# Patient Record
Sex: MALE | Race: AMERICAN INDIAN OR ALASKA NATIVE | Employment: UNEMPLOYED | URBAN - METROPOLITAN AREA
[De-identification: names, ages, dates, MRNs, and addresses within clinical notes are randomized per-mention and may not be internally consistent; named-entity substitution may affect disease eponyms.]

---

## 2019-07-20 ENCOUNTER — APPOINTMENT (OUTPATIENT)
Dept: ULTRASOUND IMAGING | Facility: CLINIC | Age: 44
End: 2019-07-20
Attending: INTERNAL MEDICINE
Payer: COMMERCIAL

## 2019-07-20 ENCOUNTER — HOSPITAL ENCOUNTER (INPATIENT)
Facility: CLINIC | Age: 44
LOS: 13 days | Discharge: HOME | End: 2019-08-02
Attending: EMERGENCY MEDICINE | Admitting: PEDIATRICS
Payer: COMMERCIAL

## 2019-07-20 DIAGNOSIS — B95.62 MRSA CELLULITIS: Primary | ICD-10-CM

## 2019-07-20 DIAGNOSIS — L03.115 CELLULITIS OF RIGHT LOWER EXTREMITY: ICD-10-CM

## 2019-07-20 DIAGNOSIS — L03.90 MRSA CELLULITIS: Primary | ICD-10-CM

## 2019-07-20 LAB
ALBUMIN SERPL-MCNC: 2.5 G/DL (ref 3.4–5)
ALP SERPL-CCNC: 87 U/L (ref 40–150)
ALT SERPL W P-5'-P-CCNC: 29 U/L (ref 0–70)
AMPHETAMINES UR QL SCN: POSITIVE
ANION GAP SERPL CALCULATED.3IONS-SCNC: 6 MMOL/L (ref 3–14)
AST SERPL W P-5'-P-CCNC: 22 U/L (ref 0–45)
BARBITURATES UR QL: NEGATIVE
BASOPHILS # BLD AUTO: 0 10E9/L (ref 0–0.2)
BASOPHILS NFR BLD AUTO: 0.1 %
BENZODIAZ UR QL: NEGATIVE
BILIRUB DIRECT SERPL-MCNC: 0.2 MG/DL (ref 0–0.2)
BILIRUB SERPL-MCNC: 0.4 MG/DL (ref 0.2–1.3)
BUN SERPL-MCNC: 14 MG/DL (ref 7–30)
CALCIUM SERPL-MCNC: 8.7 MG/DL (ref 8.5–10.1)
CANNABINOIDS UR QL SCN: NEGATIVE
CHLORIDE SERPL-SCNC: 103 MMOL/L (ref 94–109)
CO2 SERPL-SCNC: 27 MMOL/L (ref 20–32)
COCAINE UR QL: NEGATIVE
CREAT SERPL-MCNC: 0.6 MG/DL (ref 0.66–1.25)
CREAT SERPL-MCNC: 0.7 MG/DL (ref 0.66–1.25)
CRP SERPL-MCNC: 144 MG/L (ref 0–8)
DIFFERENTIAL METHOD BLD: ABNORMAL
EOSINOPHIL # BLD AUTO: 0.1 10E9/L (ref 0–0.7)
EOSINOPHIL NFR BLD AUTO: 1 %
ERYTHROCYTE [DISTWIDTH] IN BLOOD BY AUTOMATED COUNT: 13.4 % (ref 10–15)
ETHANOL UR QL SCN: NEGATIVE
GFR SERPL CREATININE-BSD FRML MDRD: >90 ML/MIN/{1.73_M2}
GFR SERPL CREATININE-BSD FRML MDRD: >90 ML/MIN/{1.73_M2}
GLUCOSE SERPL-MCNC: 111 MG/DL (ref 70–99)
HCT VFR BLD AUTO: 34.7 % (ref 40–53)
HGB BLD-MCNC: 11.5 G/DL (ref 13.3–17.7)
IMM GRANULOCYTES # BLD: 0.1 10E9/L (ref 0–0.4)
IMM GRANULOCYTES NFR BLD: 0.5 %
LACTATE BLD-SCNC: 1.1 MMOL/L (ref 0.7–2)
LYMPHOCYTES # BLD AUTO: 1.6 10E9/L (ref 0.8–5.3)
LYMPHOCYTES NFR BLD AUTO: 15.1 %
MCH RBC QN AUTO: 27.7 PG (ref 26.5–33)
MCHC RBC AUTO-ENTMCNC: 33.1 G/DL (ref 31.5–36.5)
MCV RBC AUTO: 84 FL (ref 78–100)
MONOCYTES # BLD AUTO: 1 10E9/L (ref 0–1.3)
MONOCYTES NFR BLD AUTO: 9.5 %
NEUTROPHILS # BLD AUTO: 7.9 10E9/L (ref 1.6–8.3)
NEUTROPHILS NFR BLD AUTO: 73.8 %
NRBC # BLD AUTO: 0 10*3/UL
NRBC BLD AUTO-RTO: 0 /100
OPIATES UR QL SCN: POSITIVE
PCP UR QL SCN: NEGATIVE
PLATELET # BLD AUTO: 376 10E9/L (ref 150–450)
PLATELET # BLD AUTO: 474 10E9/L (ref 150–450)
POTASSIUM SERPL-SCNC: 3.6 MMOL/L (ref 3.4–5.3)
PROT SERPL-MCNC: 7.2 G/DL (ref 6.8–8.8)
RBC # BLD AUTO: 4.15 10E12/L (ref 4.4–5.9)
SODIUM SERPL-SCNC: 136 MMOL/L (ref 133–144)
WBC # BLD AUTO: 10.8 10E9/L (ref 4–11)

## 2019-07-20 PROCEDURE — 25000128 H RX IP 250 OP 636: Performed by: EMERGENCY MEDICINE

## 2019-07-20 PROCEDURE — 36415 COLL VENOUS BLD VENIPUNCTURE: CPT | Performed by: PEDIATRICS

## 2019-07-20 PROCEDURE — 99207 ZZC APP CREDIT; MD BILLING SHARED VISIT: CPT | Performed by: INTERNAL MEDICINE

## 2019-07-20 PROCEDURE — 86803 HEPATITIS C AB TEST: CPT | Performed by: PEDIATRICS

## 2019-07-20 PROCEDURE — 86140 C-REACTIVE PROTEIN: CPT | Performed by: EMERGENCY MEDICINE

## 2019-07-20 PROCEDURE — 85049 AUTOMATED PLATELET COUNT: CPT | Performed by: PEDIATRICS

## 2019-07-20 PROCEDURE — 87340 HEPATITIS B SURFACE AG IA: CPT | Performed by: PEDIATRICS

## 2019-07-20 PROCEDURE — 25800030 ZZH RX IP 258 OP 636: Performed by: EMERGENCY MEDICINE

## 2019-07-20 PROCEDURE — 99223 1ST HOSP IP/OBS HIGH 75: CPT | Mod: AI | Performed by: PEDIATRICS

## 2019-07-20 PROCEDURE — 80307 DRUG TEST PRSMV CHEM ANLYZR: CPT | Performed by: INTERNAL MEDICINE

## 2019-07-20 PROCEDURE — 25000128 H RX IP 250 OP 636: Performed by: PEDIATRICS

## 2019-07-20 PROCEDURE — 80076 HEPATIC FUNCTION PANEL: CPT | Performed by: PEDIATRICS

## 2019-07-20 PROCEDURE — 25000132 ZZH RX MED GY IP 250 OP 250 PS 637: Performed by: INTERNAL MEDICINE

## 2019-07-20 PROCEDURE — 99285 EMERGENCY DEPT VISIT HI MDM: CPT | Mod: Z6 | Performed by: EMERGENCY MEDICINE

## 2019-07-20 PROCEDURE — 85025 COMPLETE CBC W/AUTO DIFF WBC: CPT | Performed by: EMERGENCY MEDICINE

## 2019-07-20 PROCEDURE — 99221 1ST HOSP IP/OBS SF/LOW 40: CPT | Performed by: PSYCHIATRY & NEUROLOGY

## 2019-07-20 PROCEDURE — 86706 HEP B SURFACE ANTIBODY: CPT | Performed by: PEDIATRICS

## 2019-07-20 PROCEDURE — 93971 EXTREMITY STUDY: CPT | Mod: RT

## 2019-07-20 PROCEDURE — 82565 ASSAY OF CREATININE: CPT | Performed by: PEDIATRICS

## 2019-07-20 PROCEDURE — 12000001 ZZH R&B MED SURG/OB UMMC

## 2019-07-20 PROCEDURE — 25800030 ZZH RX IP 258 OP 636: Performed by: PEDIATRICS

## 2019-07-20 PROCEDURE — 99285 EMERGENCY DEPT VISIT HI MDM: CPT | Mod: 25 | Performed by: EMERGENCY MEDICINE

## 2019-07-20 PROCEDURE — 83605 ASSAY OF LACTIC ACID: CPT | Performed by: EMERGENCY MEDICINE

## 2019-07-20 PROCEDURE — 87389 HIV-1 AG W/HIV-1&-2 AB AG IA: CPT | Performed by: PEDIATRICS

## 2019-07-20 PROCEDURE — 96365 THER/PROPH/DIAG IV INF INIT: CPT | Performed by: EMERGENCY MEDICINE

## 2019-07-20 PROCEDURE — 80320 DRUG SCREEN QUANTALCOHOLS: CPT | Performed by: INTERNAL MEDICINE

## 2019-07-20 PROCEDURE — 87040 BLOOD CULTURE FOR BACTERIA: CPT | Performed by: EMERGENCY MEDICINE

## 2019-07-20 PROCEDURE — 80048 BASIC METABOLIC PNL TOTAL CA: CPT | Performed by: EMERGENCY MEDICINE

## 2019-07-20 RX ORDER — POLYETHYLENE GLYCOL 3350 17 G/17G
17 POWDER, FOR SOLUTION ORAL DAILY PRN
Status: DISCONTINUED | OUTPATIENT
Start: 2019-07-20 | End: 2019-08-02

## 2019-07-20 RX ORDER — BUPRENORPHINE 2 MG/1
4 TABLET SUBLINGUAL 2 TIMES DAILY PRN
Status: DISCONTINUED | OUTPATIENT
Start: 2019-07-20 | End: 2019-07-22

## 2019-07-20 RX ORDER — NALOXONE HYDROCHLORIDE 0.4 MG/ML
.1-.4 INJECTION, SOLUTION INTRAMUSCULAR; INTRAVENOUS; SUBCUTANEOUS
Status: DISCONTINUED | OUTPATIENT
Start: 2019-07-20 | End: 2019-08-02 | Stop reason: HOSPADM

## 2019-07-20 RX ORDER — CLONIDINE HYDROCHLORIDE 0.1 MG/1
0.1 TABLET ORAL 3 TIMES DAILY PRN
Status: DISCONTINUED | OUTPATIENT
Start: 2019-07-20 | End: 2019-07-20

## 2019-07-20 RX ORDER — VANCOMYCIN HYDROCHLORIDE 1 G/200ML
15 INJECTION, SOLUTION INTRAVENOUS ONCE
Status: COMPLETED | OUTPATIENT
Start: 2019-07-20 | End: 2019-07-20

## 2019-07-20 RX ORDER — OXYCODONE HYDROCHLORIDE 5 MG/1
5-10 TABLET ORAL
Status: DISCONTINUED | OUTPATIENT
Start: 2019-07-20 | End: 2019-07-20

## 2019-07-20 RX ORDER — SODIUM CHLORIDE 9 MG/ML
1000 INJECTION, SOLUTION INTRAVENOUS CONTINUOUS
Status: DISCONTINUED | OUTPATIENT
Start: 2019-07-20 | End: 2019-07-20

## 2019-07-20 RX ORDER — CLOTRIMAZOLE 1 G/ML
SOLUTION TOPICAL 2 TIMES DAILY
Status: DISCONTINUED | OUTPATIENT
Start: 2019-07-20 | End: 2019-08-02 | Stop reason: HOSPADM

## 2019-07-20 RX ORDER — SODIUM CHLORIDE 9 MG/ML
1000 INJECTION, SOLUTION INTRAVENOUS CONTINUOUS
Status: DISCONTINUED | OUTPATIENT
Start: 2019-07-20 | End: 2019-07-22

## 2019-07-20 RX ORDER — CEFAZOLIN SODIUM 1 G/50ML
1250 SOLUTION INTRAVENOUS EVERY 12 HOURS
Status: DISCONTINUED | OUTPATIENT
Start: 2019-07-20 | End: 2019-07-22

## 2019-07-20 RX ORDER — OXYCODONE HYDROCHLORIDE 5 MG/1
5 TABLET ORAL EVERY 4 HOURS PRN
Status: DISCONTINUED | OUTPATIENT
Start: 2019-07-20 | End: 2019-08-02

## 2019-07-20 RX ADMIN — SODIUM CHLORIDE 1000 ML: 9 INJECTION, SOLUTION INTRAVENOUS at 06:42

## 2019-07-20 RX ADMIN — VANCOMYCIN HYDROCHLORIDE 1250 MG: 10 INJECTION, POWDER, LYOPHILIZED, FOR SOLUTION INTRAVENOUS at 18:05

## 2019-07-20 RX ADMIN — SODIUM CHLORIDE 1000 ML: 9 INJECTION, SOLUTION INTRAVENOUS at 04:53

## 2019-07-20 RX ADMIN — CLOTRIMAZOLE: 10 SOLUTION TOPICAL at 19:54

## 2019-07-20 RX ADMIN — VANCOMYCIN HYDROCHLORIDE 1000 MG: 1 INJECTION, SOLUTION INTRAVENOUS at 05:13

## 2019-07-20 RX ADMIN — CLOTRIMAZOLE: 10 SOLUTION TOPICAL at 13:21

## 2019-07-20 RX ADMIN — ENOXAPARIN SODIUM 40 MG: 40 INJECTION SUBCUTANEOUS at 10:27

## 2019-07-20 ASSESSMENT — MIFFLIN-ST. JEOR: SCORE: 1668.31

## 2019-07-20 ASSESSMENT — ACTIVITIES OF DAILY LIVING (ADL)
ADLS_ACUITY_SCORE: 10
SWALLOWING: 0-->SWALLOWS FOODS/LIQUIDS WITHOUT DIFFICULTY
WHICH_OF_THE_ABOVE_FUNCTIONAL_RISKS_HAD_A_RECENT_ONSET_OR_CHANGE?: AMBULATION
ADLS_ACUITY_SCORE: 10
TOILETING: 0-->INDEPENDENT
ADLS_ACUITY_SCORE: 10
RETIRED_EATING: 0-->INDEPENDENT
AMBULATION: 0-->INDEPENDENT
BATHING: 0-->INDEPENDENT
TRANSFERRING: 0-->INDEPENDENT
DRESS: 0-->INDEPENDENT
RETIRED_COMMUNICATION: 0-->UNDERSTANDS/COMMUNICATES WITHOUT DIFFICULTY
FALL_HISTORY_WITHIN_LAST_SIX_MONTHS: NO
COGNITION: 0 - NO COGNITION ISSUES REPORTED

## 2019-07-20 NOTE — PLAN OF CARE
Pt up to unit at 0607 from ED. AxOx4, sleepy. Denies CP, N/T, nausea, SOB. IV infusing NS and vanco. VSS. BP elevated. Pt ambulated into room independently. Oriented pt to room. Left in care of day shift RN.

## 2019-07-20 NOTE — PROGRESS NOTES
"Focus: Security alert  DB: Went into patients room to get him ready to start completing a shower and complete foot care. While helping patient get hygiene cares completed and changing his clothing nursing noticed that syringes, medication, containers fell out of his shorts that he was wearing.   I: Called Dr Arguelles and nursing supervisor to obtain directions on how to move forward.   E: Patient states\" You can lock all that stuff up for me if you want.\" Patient was open and free with emptying the rest of his pockets. Patient continues to be extremely sedated. He can open his eyes and answer questions however he falls back asleep mid sentence. In belongings he had multiple syringes, alcohol swabs, Suboxone(States patient) He also states\" I didn't take anything today. Credit cards, wallet,containers of unknown etc.... Dr Arguelles is going to come down and speak with patient. Utox of urine is going to be obtained per Dr Arguelles's orders. Status of patient will continue to be monitored.   "

## 2019-07-20 NOTE — PROGRESS NOTES
"Focus: Vero Arguelles's conversation with patient  DB: Dr Arguelles spoke with patient and asked if it would be ok if we searched his room?   E: Patient stated\" that is ok they can.\" Security was alerted of plan of cares for room search. Other items that were left at desk with charge nurse Hansel. Also plan is for patient to be placed on 1;1. Status of patient will continue to be monitored.   "

## 2019-07-20 NOTE — CONSULTS
Brief note. Full consult to follow.     Recommendations:  1) Could start Subutex 4mg BID PRN COWS of 9 or greater to ensure patient entering opiate withdrawal and then transition to Suboxone at discharge.   2) Patient reports needing a Suboxone provider but indicates he does have enough current medication to bridge to an appointment. CTCs on [facility] (1-7129) can be a resource for providers.   3) The patient is open to CD treatment at this time. Agree with SW consult.   4) Patient unwilling to start a psychotropic at this time. Please reconsult psychiatry should he change his mind.     Thank you for this interesting consult.     Colin Martinez MD

## 2019-07-20 NOTE — PHARMACY-VANCOMYCIN DOSING SERVICE
Pharmacy Vancomycin Initial Note  Date of Service 2019  Patient's  1975  43 year old, male    Indication: Skin and Soft Tissue Infection    Current estimated CrCl = CrCl cannot be calculated (No order found.).    Creatinine for last 3 days  No results found for requested labs within last 72 hours.    Recent Vancomycin Level(s) for last 3 days  No results found for requested labs within last 72 hours.      Vancomycin IV Administrations (past 72 hours)      No vancomycin orders with administrations in past 72 hours.                Nephrotoxins and other renal medications (From now, onward)    Start     Dose/Rate Route Frequency Ordered Stop    19 0500  vancomycin (VANCOCIN) 1000 mg in dextrose 5% 200 mL PREMIX      15 mg/kg × 74.1 kg  200 mL/hr over 1 Hours Intravenous ONCE 19 0434            Contrast Orders - past 72 hours (72h ago, onward)    None                Plan:  1.  Start vancomycin  1000 mg ( 15 mg/kg ) IV once.   2.  Goal Trough Level: 10-15 mg/L   3.  Pharmacy will check trough levels as appropriate.  4. Serum creatinine levels will be ordered a minimum of twice weekly.    5. [place] method utilized to dose vancomycin therapy: Method 2    Patrick Solorzano
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
## 2019-07-20 NOTE — CONSULTS
"[facility], [place]   Psychiatric Consult   Consult date: 7/20/2019        Reason for Consult:   Depression, opiate use disorder        HPI:     The patient is a 44yo male with a history of depression and opiate use disorder who was seen for opiate withdrawal. Has been sedated and did have syringes and Suboxone in his pants pocket on admission. The patient reports that he is doing \"all right.\" Does report some withdrawal symptoms of running nose and body aches. Denies problems with sleep or appetite. Denies SI or HI. Is open to CD treatment and would like to resume Suboxone maintenance. No current provider. Does not feel that he needs medications for depression or anxiety.          Past Psychiatric History:     PTSD, MDD versus BAD  Has been on Suboxone maintenance. No current provider.   Has been on gabapentin, zoloft, Tenex and VPA in the past.         Substance Use and History:     Opiate use disorder  History of alcohol use disorder        Past Medical History:   PAST MEDICAL HISTORY: History reviewed. No pertinent past medical history.    PAST SURGICAL HISTORY: History reviewed. No pertinent surgical history.          Family History:   FAMILY HISTORY: History reviewed. No pertinent family history.        Social History:   SOCIAL HISTORY:   Social History     Tobacco Use     Smoking status: Current Every Day Smoker     Packs/day: 0.50     Smokeless tobacco: Never Used   Substance Use Topics     Alcohol use: Not Currently              PTA Medications:     No medications prior to admission.          Allergies:     Allergies   Allergen Reactions     Shellfish-Derived Products Shortness Of Breath          Labs:     Recent Results (from the past 48 hour(s))   CBC with platelets differential    Collection Time: 07/20/19  4:37 AM   Result Value Ref Range    WBC 10.8 4.0 - 11.0 10e9/L    RBC Count 4.15 (L) 4.4 - 5.9 10e12/L    Hemoglobin 11.5 (L) 13.3 - 17.7 g/dL    Hematocrit 34.7 (L) 40.0 " - 53.0 %    MCV 84 78 - 100 fl    MCH 27.7 26.5 - 33.0 pg    MCHC 33.1 31.5 - 36.5 g/dL    RDW 13.4 10.0 - 15.0 %    Platelet Count 474 (H) 150 - 450 10e9/L    Diff Method Automated Method     % Neutrophils 73.8 %    % Lymphocytes 15.1 %    % Monocytes 9.5 %    % Eosinophils 1.0 %    % Basophils 0.1 %    % Immature Granulocytes 0.5 %    Nucleated RBCs 0 0 /100    Absolute Neutrophil 7.9 1.6 - 8.3 10e9/L    Absolute Lymphocytes 1.6 0.8 - 5.3 10e9/L    Absolute Monocytes 1.0 0.0 - 1.3 10e9/L    Absolute Eosinophils 0.1 0.0 - 0.7 10e9/L    Absolute Basophils 0.0 0.0 - 0.2 10e9/L    Abs Immature Granulocytes 0.1 0 - 0.4 10e9/L    Absolute Nucleated RBC 0.0    Basic metabolic panel    Collection Time: 07/20/19  4:37 AM   Result Value Ref Range    Sodium 136 133 - 144 mmol/L    Potassium 3.6 3.4 - 5.3 mmol/L    Chloride 103 94 - 109 mmol/L    Carbon Dioxide 27 20 - 32 mmol/L    Anion Gap 6 3 - 14 mmol/L    Glucose 111 (H) 70 - 99 mg/dL    Urea Nitrogen 14 7 - 30 mg/dL    Creatinine 0.70 0.66 - 1.25 mg/dL    GFR Estimate >90 >60 mL/min/[1.73_m2]    GFR Estimate If Black >90 >60 mL/min/[1.73_m2]    Calcium 8.7 8.5 - 10.1 mg/dL   CRP inflammation    Collection Time: 07/20/19  4:37 AM   Result Value Ref Range    CRP Inflammation 144.0 (H) 0.0 - 8.0 mg/L   Lactic acid whole blood    Collection Time: 07/20/19  4:37 AM   Result Value Ref Range    Lactic Acid 1.1 0.7 - 2.0 mmol/L   Blood culture    Collection Time: 07/20/19  4:38 AM   Result Value Ref Range    Specimen Description Blood Right Hand     Special Requests Aerobic and anaerobic bottles received     Culture Micro No growth after 7 hours    Blood culture    Collection Time: 07/20/19  5:04 AM   Result Value Ref Range    Specimen Description Blood Left Arm     Special Requests Aerobic and anaerobic bottles received     Culture Micro No growth after 7 hours    Platelet count    Collection Time: 07/20/19 10:59 AM   Result Value Ref Range    Platelet Count 376 150 - 450  "10e9/L   Creatinine    Collection Time: 07/20/19 10:59 AM   Result Value Ref Range    Creatinine 0.60 (L) 0.66 - 1.25 mg/dL    GFR Estimate >90 >60 mL/min/[1.73_m2]    GFR Estimate If Black >90 >60 mL/min/[1.73_m2]   Hepatic panel    Collection Time: 07/20/19 10:59 AM   Result Value Ref Range    Bilirubin Direct 0.2 0.0 - 0.2 mg/dL    Bilirubin Total 0.4 0.2 - 1.3 mg/dL    Albumin 2.5 (L) 3.4 - 5.0 g/dL    Protein Total 7.2 6.8 - 8.8 g/dL    Alkaline Phosphatase 87 40 - 150 U/L    ALT 29 0 - 70 U/L    AST 22 0 - 45 U/L          Physical and Psychiatric Examination:     BP (!) 158/96 (BP Location: Left arm)   Pulse 69   Temp 97.3  F (36.3  C) (Oral)   Resp 16   Ht 1.803 m (5' 11\")   Wt 74.1 kg (163 lb 6.4 oz)   SpO2 100%   BMI 22.79 kg/m    Weight is 163 lbs 6.4 oz  Body mass index is 22.79 kg/m .    Physical Exam:  I have reviewed the physical exam as documented by by the medical team and agree with findings and assessment and have no additional findings to add at this time.    Mental Status Exam:  Appearance: awake, alert and adequately groomed  Attitude:  cooperative  Eye Contact:  poor   Mood:  \"all right\"  Affect:  mood congruent  Speech:  paucity of speech  Language: fluent and intact in English  Psychomotor, Gait, Musculoskeletal:  no evidence of tardive dyskinesia, dystonia, or tics  Thought Process:  goal oriented  Associations:  no loose associations  Thought Content:  no evidence of suicidal ideation or homicidal ideation and no evidence of psychotic thought  Insight:  fair  Judgement:  fair  Oriented to:  time, person, and place  Attention Span and Concentration:  intact  Recent and Remote Memory:  fair  Fund of Knowledge:  appropriate         Diagnoses:   Opiate use disorder  History of MDD verus BAD and PTSD         Assessment & Plan:     Recommendations:  1) Could start Subutex 4mg BID PRN COWS of 9 or greater to ensure patient entering opiate withdrawal and then transition to Suboxone at " discharge.   2) Patient reports needing a Suboxone provider but indicates he does have enough current medication to bridge to an appointment. CTCs on [facility] (8-7802) can be a resource for providers.   3) The patient is open to CD treatment at this time. Agree with SW consult.   4) Patient unwilling to start a psychotropic at this time. Please reconsult psychiatry should he change his mind.     Thank you for this interesting consult.     Colin Martinez MD

## 2019-07-20 NOTE — PROGRESS NOTES
"  VS: Stable   O2: Room air saturations 100%   Output: Still awaiting for first void. Patient has not been walked up to the bathroom yet   Last BM: 7/18/2019 reports patient   Activity: Patient says\" I walk but I just don't want to right now.\"    Skin: Right lower leg is reddened and swollen. Bottom of both feet are flaky and odorous. Pt's redness in right lower leg has been marked to monitor.   Pain: Pt denies pain currently   CMS: Intact   Dressing: NA   Diet: Regular Pt falls asleep in between bites of food.    LDA: NA   Equipment: Bed alarm on bed place on due to patients sedation. Placed patient on contact isolation for MRSA   Plan: Patient states\" I have been homeless for 1 month.\" When asked do you have family that can help you he said \"there around.\" Pt is a poor historian and seems to answer differently to questions. eg day of week one time he answered Wed another time Monday.    Additional Info: Asked patient with Dr Arguelles if he had suffered from a fall and he said \"No\" continue to monitor patients sedation and cognitive status. Status of patient will continue to be monitored.        "

## 2019-07-20 NOTE — PROGRESS NOTES
"[facility] Internal Medicine Progress Note            Interval History:   Record reviewed including admission note.  Seen on multiple occasions with RN.  Sedated most of the day with fluent response to questions.  RN with removal of clothes to shower note multiple syringes plus suboxone - which patient indcates he purchased on there street.  Denies use earlier today.  Indicates RLE worse since discharge from [facility].  Indicates no chills, sweats or fever.  No HA or dizziness.  No CP, SOB, cough, nausea, reflux, abd pain.  Last BM 3 days ago.  Voiding OK.  No arthralgias, rash, focal neuro c/o.  Indicates use of IV heroin 1 gm daily with last use AM 7/19.  Unclear when or if he used suboxone. Denies alcohol use.  ER visit [facility] 6/8/19 for RLE cellulitis.  Neg doppler US.  Treated with bactrim.  BC returned positive for MRSA.  Unable to contact patient.            Medications:   All medications reviewed today          Physical Exam:   Blood pressure (!) 158/96, pulse 69, temperature 97.3  F (36.3  C), temperature source Oral, resp. rate 16, height 1.803 m (5' 11\"), weight 74.1 kg (163 lb 6.4 oz), SpO2 100 %.    Intake/Output Summary (Last 24 hours) at 7/20/2019 1629  Last data filed at 7/20/2019 1149  Gross per 24 hour   Intake 450 ml   Output 400 ml   Net 50 ml       General:  Sedated.  Fluent speech.  Cooperative.  Orientated to person, place.  Not day or date.  No clinical distress.   No O2.     Heent:  Atraumatic.      Neck:    Skin:    Chest:  clear    Cardiac:  Reg without gallop, murmur.  No JVD.     Abdomen:  Non distended, soft, non-tender.  BS normal.     Extremities:  Perfused.  RLE - mild circumferential swelling.  Faint diffuse erythema anterior tibia from ankle to proximal 3rd RLE.  No calf, posterior thigh tenderness to suggest DVT.      Neuro:  No tremor.             Data:     Results for orders placed or performed during the hospital encounter of 07/20/19 (from the past 24 hour(s))   CBC with " platelets differential   Result Value Ref Range    WBC 10.8 4.0 - 11.0 10e9/L    RBC Count 4.15 (L) 4.4 - 5.9 10e12/L    Hemoglobin 11.5 (L) 13.3 - 17.7 g/dL    Hematocrit 34.7 (L) 40.0 - 53.0 %    MCV 84 78 - 100 fl    MCH 27.7 26.5 - 33.0 pg    MCHC 33.1 31.5 - 36.5 g/dL    RDW 13.4 10.0 - 15.0 %    Platelet Count 474 (H) 150 - 450 10e9/L    Diff Method Automated Method     % Neutrophils 73.8 %    % Lymphocytes 15.1 %    % Monocytes 9.5 %    % Eosinophils 1.0 %    % Basophils 0.1 %    % Immature Granulocytes 0.5 %    Nucleated RBCs 0 0 /100    Absolute Neutrophil 7.9 1.6 - 8.3 10e9/L    Absolute Lymphocytes 1.6 0.8 - 5.3 10e9/L    Absolute Monocytes 1.0 0.0 - 1.3 10e9/L    Absolute Eosinophils 0.1 0.0 - 0.7 10e9/L    Absolute Basophils 0.0 0.0 - 0.2 10e9/L    Abs Immature Granulocytes 0.1 0 - 0.4 10e9/L    Absolute Nucleated RBC 0.0    Basic metabolic panel   Result Value Ref Range    Sodium 136 133 - 144 mmol/L    Potassium 3.6 3.4 - 5.3 mmol/L    Chloride 103 94 - 109 mmol/L    Carbon Dioxide 27 20 - 32 mmol/L    Anion Gap 6 3 - 14 mmol/L    Glucose 111 (H) 70 - 99 mg/dL    Urea Nitrogen 14 7 - 30 mg/dL    Creatinine 0.70 0.66 - 1.25 mg/dL    GFR Estimate >90 >60 mL/min/[1.73_m2]    GFR Estimate If Black >90 >60 mL/min/[1.73_m2]    Calcium 8.7 8.5 - 10.1 mg/dL   CRP inflammation   Result Value Ref Range    CRP Inflammation 144.0 (H) 0.0 - 8.0 mg/L   Lactic acid whole blood   Result Value Ref Range    Lactic Acid 1.1 0.7 - 2.0 mmol/L   Blood culture   Result Value Ref Range    Specimen Description Blood Right Hand     Special Requests Aerobic and anaerobic bottles received     Culture Micro No growth after 7 hours    Blood culture   Result Value Ref Range    Specimen Description Blood Left Arm     Special Requests Aerobic and anaerobic bottles received     Culture Micro No growth after 7 hours    Platelet count   Result Value Ref Range    Platelet Count 376 150 - 450 10e9/L   Creatinine   Result Value Ref Range     Creatinine 0.60 (L) 0.66 - 1.25 mg/dL    GFR Estimate >90 >60 mL/min/[1.73_m2]    GFR Estimate If Black >90 >60 mL/min/[1.73_m2]                Assessment and Plan:   1)  RLE cellulitis,dating back to 6/8 incompletely or inadequately treated.  Recheck doppler to ensure no DVT.  2)  MRSA bacteremia documented 6/8.  Clinically not toxic or septic appearing.  On IV vanco.  No murmur or splinters to suggest SBE.  3)  IV heroin dependence, continuous.  No overt features of opiate WD clinically.  COWs low per RN. Unclear when last used suboxone.  Prior accidental right eye per record.  4)  HTN per record.  Off meds.  Previously on atenolol.   5)  Depression, anxiety per record.   6)  Altered MS with sedation, mild confusion suspect related to substance use.  Clinically does not appear septic.  No h/o closed head injury.   7)  Peripheral neuropathy per record previously on neurontin.     PLAN:  1)  Review meds, orders.  2)  Search room with drug paraphernalia.  1:1 sitter.  Bed alarm with concern regarding gait stability. Up with assist. 3)  Utox.  4)  F/u cultures.  Continue IV vanco.  Echo.  5)  Venous doppler RLE.  6)  Psyche consult.  Intervention regarding depression, anxiety, recs for detox.  7)  Clonidine for now prn.  Hold off on methadone with sedation.  Monitor COWs.  8)  Trend labs.  Add hepatic profile.  9)  Consider ID consult.  Monitor clinically.   Disposition Plan   Expected discharge unclear pending course and dispo.      Entered: Marcus Arguelles 07/20/2019, 4:29 PM     ADD:  Subutex ordered by psychiatry for opiate WD.  Discontinue clonidine.         Attestation:  I have reviewed today's vital signs, notes, medications, labs and imaging.     Marcus Arguelles MD

## 2019-07-20 NOTE — PROGRESS NOTES
"Focus: patients sedation  DB: In interviewing with patient this am he is quite sleepy. He is able to answer questions but is very disengaged with interview with nursing. Patient states\" I am homeless.\" Patient also states\" I shot up with a dirty needle last night at 7:30 last night. I normally shoot up 2 x a day.\"   I: Assessed patients lower legs.   E: Patient has a swollen right lower leg. In assessing patients lower legs he is in need of hygiene cares. Both feet are very Odorous and are in need of cares. Right lower leg is more reddened and edematous then the left lower leg. Took  Patients shoes and socks off on both lower feet. Will continue to keep patient on continuous oximetry and monitor patients status.   "

## 2019-07-20 NOTE — ED PROVIDER NOTES
"  History     Chief Complaint   Patient presents with     Leg Swelling     c/o right lower leg and ankle  pain, redness, and swelling. pt reported he was seen in [facility] a moth ago for same reason. He was on oral antibiotic for 10 days.       HPI  Mert Lyle is a 43 year old male history of opiate dependence, homelessness who is seen at [facility] 1 month ago for right ankle cellulitis treated with Bactrim and discharged.  He is now returning for worsening swelling and redness and pain over the right ankle bilaterally, has been more difficult to walk lately.    Care everywhere reveals he was indeed treated for cellulitis with Bactrim 1 DS twice daily x10 days.  Blood cultures were positive for MRSA, however patient contact could not be met.  Patient reports he improved somewhat for approximately 1 week and then for the last 2 weeks has been worsening.  No associated fevers or chills chest pain or shortness of breath.  Walking has been somewhat difficult due to pain.  Continues to use IV heroin, injected into the right leg as well and asked this point.    No numbness or tingling.  No drainage.  No open wounds.    I have reviewed the Medications, Allergies, Past Medical and Surgical History, and Social History in the Epic system.    Review of Systems   14 point review of symptoms was performed and is negative except as noted above.     Physical Exam   BP: 135/87  Pulse: 92  Temp: 98.4  F (36.9  C)  Resp: 16  Height: 180.3 cm (5' 11\")  Weight: 74.1 kg (163 lb 6.4 oz)  SpO2: 99 %      Physical Exam  GEN: Well appearing, non toxic, cooperative and conversant.   HEENT: The head is normocephalic and atraumatic. Pupils are equal round and reactive to light. Extraocular motions are intact. There is no facial swelling. The neck is nontender and supple.  Multiple tattoos on the face and neck  CV: Regular rate and rhythm. 2+ radial pulses bilaterally.  PULM: Clear to auscultation bilaterally.  ABD: Soft, nontender, " nondistended.   EXT: Right foot and distal third of the right leg is somewhat edematous, warm and erythematous.  No pustules no blisters though there is some dry flaking skin.  Certainly warm to touch compared to the contralateral side.  2+ DP pulse palpated bilaterally.  No significant pain along the ankle joint, talus midfoot or phalanges to suggest joint involvement.  NEURO: Cranial nerves II through XII are intact and symmetric. Bilateral upper and lower extremities grossly show full range of motion without any focal deficits.   SKIN: No rashes, ecchymosis, or lacerations  PSYCH: Calm and cooperative, interactive.     ED Course        Procedures               Labs Ordered and Resulted from Time of ED Arrival Up to the Time of Departure from the ED   CBC WITH PLATELETS DIFFERENTIAL - Abnormal; Notable for the following components:       Result Value    RBC Count 4.15 (*)     Hemoglobin 11.5 (*)     Hematocrit 34.7 (*)     Platelet Count 474 (*)     All other components within normal limits   BASIC METABOLIC PANEL - Abnormal; Notable for the following components:    Glucose 111 (*)     All other components within normal limits   CRP INFLAMMATION - Abnormal; Notable for the following components:    CRP Inflammation 144.0 (*)     All other components within normal limits   LACTIC ACID WHOLE BLOOD   HIV ANTIGEN ANTIBODY COMBO   HEPATITIS C ANTIBODY   HEPATITIS B SURFACE ANTIGEN   HEPATITIS B SURFACE ANTIBODY            Assessments & Plan (with Medical Decision Making)   43-year-old male with history of opiate abuse presenting with recurrent cellulitis failing outpatient Bactrim.  His dosing was likely less than appropriate for MRSA however given his risk factors ongoing duration of active cellulitis, bacteremia are present before his demonstrate a by positive blood cultures he is certainly appropriate for admission for cellulitis and IV vancomycin to start.    Blood culture result from [facility] reviewed showing that  outpatient options may be appropriate once patient has been given IV treatment given his clinical circumstance.  Patient agrees with admission.  Basic labs sent including blood cultures  Labs are revealing for CRP of 144 consistent with infection.  WBC 10.8.  Lactic acid 1.1 blood cultures are pending.    Discussed with admitting hospitalist, awaiting bed for further management.    I have reviewed the nursing notes.    I have reviewed the findings, diagnosis, plan and need for follow up with the patient.       Medication List      There are no discharge medications for this visit.         Final diagnoses:   Cellulitis of right lower extremity       7/20/2019   [facility], [place], EMERGENCY DEPARTMENT     Rusty Shahid MD  07/20/19 0800

## 2019-07-20 NOTE — PROGRESS NOTES
Focus: Security search of room  DB Security and nursing went into patients room to conduct room search with patients consent  I: Patient was placed on 1;1 per Dr Arguelles orders  E: status of patient will continue to be monitored Pt is so extremely sedated and now is laying in bed drooling with mouth wide open. Patient will answer questions but then immediately goes back to sleep.Pt is very diaphoretic looking skin is clammy to touch. .

## 2019-07-20 NOTE — ED NOTES
[facility], [place]   ED Nurse to Floor Handoff     Mert Lyle is a 43 year old male who speaks English and lives with family members,  in a home  They arrived in the ED by car from home    ED Chief Complaint: Leg Swelling (c/o right lower leg and ankle  pain, redness, and swelling. pt reported he was seen in [facility] a moth ago for same reason. He was on oral antibiotic for 10 days.  )    ED Dx;   Final diagnoses:   None         Needed?: No    Allergies:   Allergies   Allergen Reactions     Shellfish-Derived Products Shortness Of Breath   .  Past Medical Hx: History reviewed. No pertinent past medical history.   Baseline Mental status: WDL  Current Mental Status changes: at basesline    Infection present or suspected this encounter: cultures pending  Sepsis suspected: No  Isolation type: No active isolations     Activity level - Baseline/Home:  Independent  Activity Level - Current:   Independent    Bariatric equipment needed?: No    In the ED these meds were given:   Medications   0.9% sodium chloride BOLUS (1,000 mLs Intravenous New Bag 7/20/19 0453)     Followed by   sodium chloride 0.9% infusion (has no administration in time range)   vancomycin (VANCOCIN) 1000 mg in dextrose 5% 200 mL PREMIX (1,000 mg Intravenous New Bag 7/20/19 0513)       Drips running?  No    Home pump  No    Current LDAs  Peripheral IV 07/20/19 Left Lower forearm (Active)   Site Assessment WDL 7/20/2019  5:07 AM   Line Status Infusing 7/20/2019  5:07 AM   Phlebitis Scale 0-->no symptoms 7/20/2019  5:07 AM   Infiltration Scale 0 7/20/2019  5:07 AM   Infiltration Site Treatment Method  None 7/20/2019  5:07 AM   Extravasation? No 7/20/2019  5:07 AM   Dressing Intervention New dressing  7/20/2019  5:07 AM   Number of days: 0       Labs results:   Labs Ordered and Resulted from Time of ED Arrival Up to the Time of Departure from the ED   CBC WITH PLATELETS DIFFERENTIAL   BASIC METABOLIC PANEL   CRP  "INFLAMMATION   LACTIC ACID WHOLE BLOOD   BLOOD CULTURE   BLOOD CULTURE       Imaging Studies: No results found for this or any previous visit (from the past 24 hour(s)).    Recent vital signs:   /87   Pulse 92   Temp 98.4  F (36.9  C) (Oral)   Resp 16   Ht 1.803 m (5' 11\")   Wt 74.1 kg (163 lb 6.4 oz)   SpO2 99%   BMI 22.79 kg/m              Cardiac Rhythm: Other  Pt needs tele? No  Skin/wound Issues: redness and swelling to right lower leg/ankle    Code Status: Full Code    Pain control: good    Nausea control: pt had none    Abnormal labs/tests/findings requiring intervention:     Family present during ED course? No   Family Comments/Social Situation comments:     Tasks needing completion: None    Elvira Gonsalves RN  [facility] --   [phone] [place] ED  [phone] [facility] ED    "

## 2019-07-20 NOTE — PROVIDER NOTIFICATION
Security called to  items to be stored in security office. Items included wallet, Phone charging battery, Phone charging cable,and car key. Its sealed in envelope and sent to with officers. Carbon copy placed in patient chart.

## 2019-07-21 LAB
ALBUMIN SERPL-MCNC: 2.9 G/DL (ref 3.4–5)
ALP SERPL-CCNC: 104 U/L (ref 40–150)
ALT SERPL W P-5'-P-CCNC: 32 U/L (ref 0–70)
ANION GAP SERPL CALCULATED.3IONS-SCNC: 6 MMOL/L (ref 3–14)
AST SERPL W P-5'-P-CCNC: 23 U/L (ref 0–45)
BILIRUB SERPL-MCNC: 0.6 MG/DL (ref 0.2–1.3)
BUN SERPL-MCNC: 7 MG/DL (ref 7–30)
CALCIUM SERPL-MCNC: 9.2 MG/DL (ref 8.5–10.1)
CHLORIDE SERPL-SCNC: 109 MMOL/L (ref 94–109)
CO2 SERPL-SCNC: 26 MMOL/L (ref 20–32)
CREAT SERPL-MCNC: 0.47 MG/DL (ref 0.66–1.25)
CRP SERPL-MCNC: 104 MG/L (ref 0–8)
ERYTHROCYTE [DISTWIDTH] IN BLOOD BY AUTOMATED COUNT: 13.2 % (ref 10–15)
GFR SERPL CREATININE-BSD FRML MDRD: >90 ML/MIN/{1.73_M2}
GLUCOSE SERPL-MCNC: 103 MG/DL (ref 70–99)
HCT VFR BLD AUTO: 42.1 % (ref 40–53)
HGB BLD-MCNC: 13.8 G/DL (ref 13.3–17.7)
MCH RBC QN AUTO: 27.5 PG (ref 26.5–33)
MCHC RBC AUTO-ENTMCNC: 32.8 G/DL (ref 31.5–36.5)
MCV RBC AUTO: 84 FL (ref 78–100)
PLATELET # BLD AUTO: 541 10E9/L (ref 150–450)
POTASSIUM SERPL-SCNC: 3.4 MMOL/L (ref 3.4–5.3)
PROT SERPL-MCNC: 8.7 G/DL (ref 6.8–8.8)
RBC # BLD AUTO: 5.02 10E12/L (ref 4.4–5.9)
SODIUM SERPL-SCNC: 141 MMOL/L (ref 133–144)
WBC # BLD AUTO: 9.1 10E9/L (ref 4–11)

## 2019-07-21 PROCEDURE — 85027 COMPLETE CBC AUTOMATED: CPT | Performed by: INTERNAL MEDICINE

## 2019-07-21 PROCEDURE — 12000001 ZZH R&B MED SURG/OB UMMC

## 2019-07-21 PROCEDURE — 99207 ZZC CDG-MDM COMPONENT: MEETS MODERATE - UP CODED: CPT | Performed by: INTERNAL MEDICINE

## 2019-07-21 PROCEDURE — 25800030 ZZH RX IP 258 OP 636: Performed by: PEDIATRICS

## 2019-07-21 PROCEDURE — 25000128 H RX IP 250 OP 636: Performed by: INTERNAL MEDICINE

## 2019-07-21 PROCEDURE — 86140 C-REACTIVE PROTEIN: CPT | Performed by: INTERNAL MEDICINE

## 2019-07-21 PROCEDURE — 25000132 ZZH RX MED GY IP 250 OP 250 PS 637: Performed by: PSYCHIATRY & NEUROLOGY

## 2019-07-21 PROCEDURE — 25000132 ZZH RX MED GY IP 250 OP 250 PS 637: Performed by: INTERNAL MEDICINE

## 2019-07-21 PROCEDURE — 36415 COLL VENOUS BLD VENIPUNCTURE: CPT | Performed by: INTERNAL MEDICINE

## 2019-07-21 PROCEDURE — 80053 COMPREHEN METABOLIC PANEL: CPT | Performed by: INTERNAL MEDICINE

## 2019-07-21 PROCEDURE — 25000128 H RX IP 250 OP 636: Performed by: PEDIATRICS

## 2019-07-21 PROCEDURE — 99233 SBSQ HOSP IP/OBS HIGH 50: CPT | Performed by: INTERNAL MEDICINE

## 2019-07-21 RX ORDER — ONDANSETRON 2 MG/ML
4 INJECTION INTRAMUSCULAR; INTRAVENOUS EVERY 6 HOURS PRN
Status: DISCONTINUED | OUTPATIENT
Start: 2019-07-21 | End: 2019-08-02

## 2019-07-21 RX ORDER — ACETAMINOPHEN 325 MG/1
325-650 TABLET ORAL EVERY 4 HOURS PRN
Status: DISCONTINUED | OUTPATIENT
Start: 2019-07-21 | End: 2019-08-02 | Stop reason: HOSPADM

## 2019-07-21 RX ADMIN — CLOTRIMAZOLE: 10 SOLUTION TOPICAL at 09:10

## 2019-07-21 RX ADMIN — ENOXAPARIN SODIUM 40 MG: 40 INJECTION SUBCUTANEOUS at 09:10

## 2019-07-21 RX ADMIN — VANCOMYCIN HYDROCHLORIDE 1250 MG: 10 INJECTION, POWDER, LYOPHILIZED, FOR SOLUTION INTRAVENOUS at 18:48

## 2019-07-21 RX ADMIN — CLOTRIMAZOLE: 10 SOLUTION TOPICAL at 21:00

## 2019-07-21 RX ADMIN — ONDANSETRON 4 MG: 2 INJECTION INTRAMUSCULAR; INTRAVENOUS at 17:23

## 2019-07-21 RX ADMIN — ONDANSETRON 4 MG: 2 INJECTION INTRAMUSCULAR; INTRAVENOUS at 11:00

## 2019-07-21 RX ADMIN — BUPRENORPHINE HCL 4 MG: 2 TABLET SUBLINGUAL at 17:22

## 2019-07-21 RX ADMIN — BUPRENORPHINE HCL 4 MG: 2 TABLET SUBLINGUAL at 10:59

## 2019-07-21 RX ADMIN — VANCOMYCIN HYDROCHLORIDE 1250 MG: 10 INJECTION, POWDER, LYOPHILIZED, FOR SOLUTION INTRAVENOUS at 06:18

## 2019-07-21 RX ADMIN — ACETAMINOPHEN 650 MG: 325 TABLET, FILM COATED ORAL at 18:48

## 2019-07-21 ASSESSMENT — ACTIVITIES OF DAILY LIVING (ADL)
ADLS_ACUITY_SCORE: 10

## 2019-07-21 NOTE — PLAN OF CARE
Pt. Lethargic and somnolent but arouses to voice. VSS, BP elevated. Afebrile. Lung sounds CTA. Maintaining sats on RA. Bowel sounds active, Pt unable to report last BM. PP+ DP+. CMS and neuro's are intact. Denies numbness and tingling in all extremities. Denies nausea, shortness of breath, and chest pain. Denies pain at this time. Voids spontaneously without difficulty in the urinal. Tolerating diet. Wounds to bilateral feet. Not OOB this shift. PIV is patent and infusing. PCD's on BLE's. Bilateral heels are elevated off the bed. Call light is within reach, pt able to make needs known and is resting comfortably between cares. Will continue to monitor.

## 2019-07-21 NOTE — PROGRESS NOTES
"[facility] Internal Medicine Progress Note            Interval History:   Record reviewed.   Seen with RN.  Indicates not feeling well today.  Nausea, sweats, diarrhea.  Similar symptoms with opiate WD.  Sebutex ordered by psychiatry 7/20.  COWS earlier 7.  More alert.   No HA or dizziness.  No CP, SOB, cough, reflux, abd pain.  Voiding OK.  No arthralgias, rash, focal neuro c/o.  Indicates use of IV heroin 1 gm daily with last use AM 7/19.  Unclear when or if he used suboxone. Denies alcohol use.  ER visit [facility] 6/8/19 for RLE cellulitis.  Neg doppler US.  Treated with bactrim.  BC subsequently returned positive for MRSA.  Unable to contact patient.  Remains on IV Vanco.           Medications:   All medications reviewed today          Physical Exam:   Blood pressure (!) 151/94, pulse 87, temperature 97.6  F (36.4  C), temperature source Axillary, resp. rate 15, height 1.803 m (5' 11\"), weight 74.1 kg (163 lb 6.4 oz), SpO2 98 %.    Intake/Output Summary (Last 24 hours) at 7/20/2019 1629  Last data filed at 7/20/2019 1149  Gross per 24 hour   Intake 450 ml   Output 400 ml   Net 50 ml       General:  More alert.   Fluent speech.  Cooperative.  Orientated to person, place.    No clinical distress.   No O2.  Mild diaphoresis    Heent:  Atraumatic.      Neck:    Skin:    Chest:  clear    Cardiac:  Reg without gallop, murmur.  No JVD.     Abdomen:  Non distended, soft, non-tender.  BS normal.     Extremities:  Perfused.  RLE - mild circumferential swelling.  Decrease in faint diffuse erythema anterior tibia from ankle to proximal 3rd RLE.  No calf, posterior thigh tenderness to suggest DVT.      Neuro:  No tremor.             Data:     Results for orders placed or performed during the hospital encounter of 07/20/19 (from the past 24 hour(s))   Psychiatry IP Consult: depression, anxiety, opitae detox; Consultant may enter orders: Yes; Patient to be seen: Routine; Call back #: [phone]; Requesting provider? " Attending physician    Colin Ram MD     7/20/2019  5:48 PM  Brief note. Full consult to follow.     Recommendations:  1) Could start Subutex 4mg BID PRN COWS of 9 or greater to ensure   patient entering opiate withdrawal and then transition to   Suboxone at discharge.   2) Patient reports needing a Suboxone provider but indicates he   does have enough current medication to bridge to an appointment.   [facility] on [address] ([phone]) can be a resource for providers.   3) The patient is open to CD treatment at this time. Agree with   SW consult.   4) Patient unwilling to start a psychotropic at this time. Please   reconsult psychiatry should he change his mind.     Thank you for this interesting consult.     Colin Martinez MD   Drug abuse screen 8 urine (UR)   Result Value Ref Range    Amphetamine Qual Urine Positive (A) NEG^Negative    Barbiturates Qual Urine Negative NEG^Negative    Benzodiazepine Qual Urine Negative NEG^Negative    Cannabinoids Qual Urine Negative NEG^Negative    Cocaine Qual Urine Negative NEG^Negative    Ethanol Qual Urine Negative NEG^Negative    Opiates Qualitative Urine Positive (A) NEG^Negative    PCP Qual Urine Negative NEG^Negative   US Lower Extremity Venous Duplex Right    Narrative    EXAMINATION: DOPPLER VENOUS ULTRASOUND OF THE RIGHT LOWER EXTREMITY,  7/20/2019 7:00 PM     COMPARISON: No relevant prior exam.    HISTORY: Erythema and swelling of the right lower extremity.    TECHNIQUE:  Gray-scale evaluation with compression, spectral flow, and  color Doppler assessment of the deep venous system of the right leg  from groin to knee, and then at the ankle.    FINDINGS:  In the right lower extremity, the common femoral, femoral, popliteal  and posterior tibial veins demonstrate normal compressibility and  blood flow.    Mildly enlarged lymph nodes in the right groin. For example there is a  1.9 x 1.4 x 0.7 cm node. And a and a 3.1 x 2.5 x 1.1 cm node. Nodes  demonstrate  preserved fatty catia.      Impression    IMPRESSION:  1.  No evidence of right lower extremity deep venous thrombosis.  2.  Right inguinal adenopathy without suspicious features, possibly  reactive.    I have personally reviewed the examination and initial interpretation  and I agree with the findings.    ISAMAR ALVAREZ MD   Comprehensive metabolic panel   Result Value Ref Range    Sodium 141 133 - 144 mmol/L    Potassium 3.4 3.4 - 5.3 mmol/L    Chloride 109 94 - 109 mmol/L    Carbon Dioxide 26 20 - 32 mmol/L    Anion Gap 6 3 - 14 mmol/L    Glucose 103 (H) 70 - 99 mg/dL    Urea Nitrogen 7 7 - 30 mg/dL    Creatinine 0.47 (L) 0.66 - 1.25 mg/dL    GFR Estimate >90 >60 mL/min/[1.73_m2]    GFR Estimate If Black >90 >60 mL/min/[1.73_m2]    Calcium 9.2 8.5 - 10.1 mg/dL    Bilirubin Total 0.6 0.2 - 1.3 mg/dL    Albumin 2.9 (L) 3.4 - 5.0 g/dL    Protein Total 8.7 6.8 - 8.8 g/dL    Alkaline Phosphatase 104 40 - 150 U/L    ALT 32 0 - 70 U/L    AST 23 0 - 45 U/L   CRP inflammation   Result Value Ref Range    CRP Inflammation 104.0 (H) 0.0 - 8.0 mg/L   CBC with platelets   Result Value Ref Range    WBC 9.1 4.0 - 11.0 10e9/L    RBC Count 5.02 4.4 - 5.9 10e12/L    Hemoglobin 13.8 13.3 - 17.7 g/dL    Hematocrit 42.1 40.0 - 53.0 %    MCV 84 78 - 100 fl    MCH 27.5 26.5 - 33.0 pg    MCHC 32.8 31.5 - 36.5 g/dL    RDW 13.2 10.0 - 15.0 %    Platelet Count 541 (H) 150 - 450 10e9/L      7/20           Assessment and Plan:   1)  RLE cellulitis dating back to 6/8 incompletely or inadequately treated.  Improved on IV vanco.  Neg follow up doppler for DVT.   2)  MRSA bacteremia documented 6/8.  Clinically not toxic or septic appearing.  On IV vanco.  No murmur or splinters to suggest SBE.  3)  IV heroin dependence, continuous.  Symptoms presently despite earlier COWS to suggest opiate WD.   Better after sebutex.   4)  HTN per record.  Sub-optimal off meds.  Previously on atenolol.   5)  Depression, anxiety per record.   6)  Altered MS  with sedation, mild confusion suspect related to substance use.  Improved.   7)  Peripheral neuropathy per record previously on neurontin.     PLAN:  1)   Continue IV vanco.  Check Echo.  Probable ID consult.  2)  Monitor COWs.  Sebutex prn.  Zofran IV prn.  3)  CD consult.  4)  Start low dose lopressor 12.5 mg BID.   5)  Trend labs.  Monitor clinically.   Disposition Plan   Expected discharge unclear pending course and dispo.      Entered: Marcus Arguelles 07/21/2019, 2:08 PM              Attestation:  I have reviewed today's vital signs, notes, medications, labs and imaging.     Marcus Arguelles MD

## 2019-07-21 NOTE — PLAN OF CARE
VS:   Pt has elevated BP but other VSS   Output:   Pt is using toilet independently   Activity:   Pt is independent in room.   Skin: Pt has intact skin, both feet are swollen and have cracked skin.   Pain:   Pt denies any pain.   Neuro/CMS:   Pt is A&Ox4. Pt reported nausea that was relieved by zofran.   Dressing(s):   N/A.   Diet:   Regular   LDA:   Pt has a patent. PIV in L hand.   Equipment:   N/A   Plan:   Treat infection and detox from narcotics.   Additional Info:   Pt has echo tomorrow morning.

## 2019-07-21 NOTE — PLAN OF CARE
VS: VSS, except for high Bp; pt lethargic and somnolent as he undergoes heroin withdrawal but follows commands and can answer questions   O2: Room air   Output: urinal   Last BM: Pt unable to report   Activity: 1 assist   Up for meals? No   Skin: Edema rt foot; wounds both feet   Pain: denies   CMS: intact   Dressing: N/a   Diet: regular   LDA: PIV TKO   Equipment: IV pole   Plan: TBD   Additional Info: Pt admitted today from emergency room for cellulitis; day nurse discovered heroin supplies in his belongings. Room searched, 1:1 initiated. Pt reported that his last dose of heroin was 7am this morning. In active withdrawal. Q4 COWS scores were 4, 4, and 8. No subutex given this shift. Will continue to monitor.

## 2019-07-22 ENCOUNTER — ANESTHESIA (OUTPATIENT)
Dept: MEDSURG UNIT | Facility: CLINIC | Age: 44
End: 2019-07-22
Payer: COMMERCIAL

## 2019-07-22 ENCOUNTER — ANESTHESIA EVENT (OUTPATIENT)
Dept: MEDSURG UNIT | Facility: CLINIC | Age: 44
End: 2019-07-22
Payer: COMMERCIAL

## 2019-07-22 ENCOUNTER — APPOINTMENT (OUTPATIENT)
Dept: CARDIOLOGY | Facility: CLINIC | Age: 44
End: 2019-07-22
Attending: INTERNAL MEDICINE
Payer: COMMERCIAL

## 2019-07-22 LAB
ANION GAP SERPL CALCULATED.3IONS-SCNC: 6 MMOL/L (ref 3–14)
BUN SERPL-MCNC: 4 MG/DL (ref 7–30)
CALCIUM SERPL-MCNC: 8.4 MG/DL (ref 8.5–10.1)
CHLORIDE SERPL-SCNC: 110 MMOL/L (ref 94–109)
CO2 SERPL-SCNC: 24 MMOL/L (ref 20–32)
CREAT SERPL-MCNC: 0.56 MG/DL (ref 0.66–1.25)
CRP SERPL-MCNC: 34.8 MG/L (ref 0–8)
GFR SERPL CREATININE-BSD FRML MDRD: >90 ML/MIN/{1.73_M2}
GLUCOSE SERPL-MCNC: 99 MG/DL (ref 70–99)
HCV AB SERPL QL IA: REACTIVE
HIV 1+2 AB+HIV1 P24 AG SERPL QL IA: NONREACTIVE
POTASSIUM SERPL-SCNC: 3.5 MMOL/L (ref 3.4–5.3)
SODIUM SERPL-SCNC: 140 MMOL/L (ref 133–144)
VANCOMYCIN SERPL-MCNC: 6.1 MG/L

## 2019-07-22 PROCEDURE — 36415 COLL VENOUS BLD VENIPUNCTURE: CPT | Performed by: INTERNAL MEDICINE

## 2019-07-22 PROCEDURE — 25800030 ZZH RX IP 258 OP 636: Performed by: INTERNAL MEDICINE

## 2019-07-22 PROCEDURE — 36415 COLL VENOUS BLD VENIPUNCTURE: CPT | Performed by: PEDIATRICS

## 2019-07-22 PROCEDURE — 86140 C-REACTIVE PROTEIN: CPT | Performed by: INTERNAL MEDICINE

## 2019-07-22 PROCEDURE — 80048 BASIC METABOLIC PNL TOTAL CA: CPT | Performed by: INTERNAL MEDICINE

## 2019-07-22 PROCEDURE — 25000125 ZZHC RX 250: Performed by: NURSE ANESTHETIST, CERTIFIED REGISTERED

## 2019-07-22 PROCEDURE — 99207 ZZC CONSULT E&M CHANGED TO INITIAL LEVEL: CPT | Performed by: INTERNAL MEDICINE

## 2019-07-22 PROCEDURE — 25000132 ZZH RX MED GY IP 250 OP 250 PS 637: Performed by: INTERNAL MEDICINE

## 2019-07-22 PROCEDURE — 25000132 ZZH RX MED GY IP 250 OP 250 PS 637: Performed by: PSYCHIATRY & NEUROLOGY

## 2019-07-22 PROCEDURE — 99222 1ST HOSP IP/OBS MODERATE 55: CPT | Performed by: INTERNAL MEDICINE

## 2019-07-22 PROCEDURE — 93306 TTE W/DOPPLER COMPLETE: CPT

## 2019-07-22 PROCEDURE — 80202 ASSAY OF VANCOMYCIN: CPT | Performed by: PEDIATRICS

## 2019-07-22 PROCEDURE — 25800030 ZZH RX IP 258 OP 636: Performed by: PEDIATRICS

## 2019-07-22 PROCEDURE — 93306 TTE W/DOPPLER COMPLETE: CPT | Mod: 26 | Performed by: INTERNAL MEDICINE

## 2019-07-22 PROCEDURE — 99232 SBSQ HOSP IP/OBS MODERATE 35: CPT | Performed by: INTERNAL MEDICINE

## 2019-07-22 PROCEDURE — 40000671 ZZH STATISTIC ANESTHESIA CASE

## 2019-07-22 PROCEDURE — 12000001 ZZH R&B MED SURG/OB UMMC

## 2019-07-22 PROCEDURE — 25000128 H RX IP 250 OP 636: Performed by: PEDIATRICS

## 2019-07-22 RX ORDER — METOPROLOL SUCCINATE 25 MG/1
25 TABLET, EXTENDED RELEASE ORAL ONCE
Status: DISCONTINUED | OUTPATIENT
Start: 2019-07-22 | End: 2019-07-22

## 2019-07-22 RX ORDER — BUPRENORPHINE 2 MG/1
4 TABLET SUBLINGUAL 2 TIMES DAILY
Status: DISCONTINUED | OUTPATIENT
Start: 2019-07-22 | End: 2019-08-02 | Stop reason: HOSPADM

## 2019-07-22 RX ORDER — SODIUM CHLORIDE 9 MG/ML
1000 INJECTION, SOLUTION INTRAVENOUS CONTINUOUS
Status: DISCONTINUED | OUTPATIENT
Start: 2019-07-22 | End: 2019-07-22

## 2019-07-22 RX ORDER — LIDOCAINE HYDROCHLORIDE 20 MG/ML
INJECTION, SOLUTION INFILTRATION; PERINEURAL PRN
Status: DISCONTINUED | OUTPATIENT
Start: 2019-07-22 | End: 2019-07-22

## 2019-07-22 RX ORDER — VANCOMYCIN HYDROCHLORIDE 1 G/200ML
1000 INJECTION, SOLUTION INTRAVENOUS EVERY 8 HOURS
Status: DISCONTINUED | OUTPATIENT
Start: 2019-07-23 | End: 2019-07-25

## 2019-07-22 RX ORDER — METOPROLOL TARTRATE 25 MG/1
25 TABLET, FILM COATED ORAL 2 TIMES DAILY
Status: DISCONTINUED | OUTPATIENT
Start: 2019-07-22 | End: 2019-08-02 | Stop reason: HOSPADM

## 2019-07-22 RX ADMIN — METOPROLOL TARTRATE 25 MG: 25 TABLET, FILM COATED ORAL at 20:08

## 2019-07-22 RX ADMIN — Medication 12.5 MG: at 09:50

## 2019-07-22 RX ADMIN — VANCOMYCIN HYDROCHLORIDE 1250 MG: 10 INJECTION, POWDER, LYOPHILIZED, FOR SOLUTION INTRAVENOUS at 18:35

## 2019-07-22 RX ADMIN — LIDOCAINE HYDROCHLORIDE 0.1 ML: 20 INJECTION, SOLUTION INFILTRATION; PERINEURAL at 21:05

## 2019-07-22 RX ADMIN — BUPRENORPHINE HCL 4 MG: 2 TABLET SUBLINGUAL at 20:08

## 2019-07-22 RX ADMIN — CLOTRIMAZOLE: 10 SOLUTION TOPICAL at 08:30

## 2019-07-22 RX ADMIN — SODIUM CHLORIDE 1000 ML: 9 INJECTION, SOLUTION INTRAVENOUS at 00:32

## 2019-07-22 RX ADMIN — VANCOMYCIN HYDROCHLORIDE 1250 MG: 10 INJECTION, POWDER, LYOPHILIZED, FOR SOLUTION INTRAVENOUS at 06:16

## 2019-07-22 RX ADMIN — ENOXAPARIN SODIUM 40 MG: 40 INJECTION SUBCUTANEOUS at 08:20

## 2019-07-22 RX ADMIN — CLOTRIMAZOLE: 10 SOLUTION TOPICAL at 20:10

## 2019-07-22 RX ADMIN — BUPRENORPHINE HCL 4 MG: 2 TABLET SUBLINGUAL at 08:29

## 2019-07-22 ASSESSMENT — ACTIVITIES OF DAILY LIVING (ADL)
ADLS_ACUITY_SCORE: 9
ADLS_ACUITY_SCORE: 9
ADLS_ACUITY_SCORE: 10
ADLS_ACUITY_SCORE: 9
ADLS_ACUITY_SCORE: 10
ADLS_ACUITY_SCORE: 10

## 2019-07-22 NOTE — PROGRESS NOTES
7/22/2019    CD consult acknowledged. Due to high volumes, pt may not be seen until the end of the week or early next week. CD will continue to monitor and assess as appropriate.     Milagros Hong, [facility]  [phone]

## 2019-07-22 NOTE — PLAN OF CARE
Patient A&O x4, lungs sound clear, Bowel sound active- passing gas, Denied CP, lightheadedness, dizziness, numbness, tingling and SOB, iv saline locked,  drinking well and voiding spontaneously without difficulties, able to wiggle toes, right foot swollen, red and tender, sores of feet flaky and dry, complained of joints aches, /110, called moonlighter and got order for metoprolol 25 mg,isolation precaution maintained, up in room,COWS score was 7, heels elevated off bed, demonstrates the ability to use call light appropriately, will continue to monitor patient.
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
## 2019-07-22 NOTE — PROGRESS NOTES
"[facility] Internal Medicine Progress Note            Interval History:   Record reviewed.  Seen with RN.  Indicates feeling well.  WD symptoms of sweats, chills, leg aches relieved with sebutex.  COWS 9.  Indicates up to BR with stable gait and no LH.  No CP, SOB, cough, nausea, reflux, abd pain.  Tolerating diet.  Formed BM today.  Voiding OK.           Medications:   All medications reviewed today          Physical Exam:   Blood pressure (!) 146/100, pulse 65, temperature 95.4  F (35.2  C), temperature source Oral, resp. rate 16, height 1.803 m (5' 11\"), weight 74.1 kg (163 lb 6.4 oz), SpO2 99 %.    Intake/Output Summary (Last 24 hours) at 7/20/2019 1629  Last data filed at 7/20/2019 1149  Gross per 24 hour   Intake 450 ml   Output 400 ml   Net 50 ml       General:  More alert.   Fluent speech.  Cooperative.  Orientated to person, place.    No clinical distress.   No O2.      Heent:  Atraumatic.      Neck:    Skin:    Chest:  clear    Cardiac:  Reg without gallop, murmur.  No JVD.     Abdomen:  Non distended, soft, non-tender.  BS normal.     Extremities:  Perfused.  RLE - decrease circumferential swelling.  Near complete resoltuoion faint diffuse erythema anterior tibia from ankle to proximal 3rd RLE.  No calf, posterior thigh tenderness to suggest DVT.      Neuro:  No tremor.             Data:     Results for orders placed or performed during the hospital encounter of 07/20/19 (from the past 24 hour(s))   Basic metabolic panel   Result Value Ref Range    Sodium 140 133 - 144 mmol/L    Potassium 3.5 3.4 - 5.3 mmol/L    Chloride 110 (H) 94 - 109 mmol/L    Carbon Dioxide 24 20 - 32 mmol/L    Anion Gap 6 3 - 14 mmol/L    Glucose 99 70 - 99 mg/dL    Urea Nitrogen 4 (L) 7 - 30 mg/dL    Creatinine 0.56 (L) 0.66 - 1.25 mg/dL    GFR Estimate >90 >60 mL/min/[1.73_m2]    GFR Estimate If Black >90 >60 mL/min/[1.73_m2]    Calcium 8.4 (L) 8.5 - 10.1 mg/dL   CRP inflammation   Result Value Ref Range    CRP Inflammation " 34.8 (H) 0.0 - 8.0 mg/L      7/20           Assessment and Plan:   1)  RLE cellulitis dating back to 6/8 incompletely or inadequately treated.  Resolving on IV vanco.  Neg follow up doppler for DVT.   2)  MRSA bacteremia documented 6/8.  Clinically not toxic or septic appearing.  On IV vanco.  No murmur or splinters to suggest SBE.  Echo pending.   3)  IV heroin dependence, continuous.  Symptoms presently despite earlier COWS to suggest opiate WD.   Better after sebutex.   4)  HTN per record.  Sub-optimal off meds.  Just added low dose lopressor.    5)  Depression, anxiety per record.   6)  Altered MS with sedation, mild confusion suspect related to substance use.  Improved.   7)  Peripheral neuropathy per record previously on neurontin.     PLAN:  1)   Continue IV vanco.  Check Echo.   ID consult.  2)  Monitor COWs.  Sebutex prn.  Zofran IV prn.  SL IV. 3)  CD consult.  4)  Lopressor 12.5 mg BID.   5)  Trend labs.  Monitor clinically. OK to discontinue 1:1.  Indicates no plan to use drugs other than administered.   Disposition Plan   Expected discharge unclear pending course and dispo.  May need 4 weeks IV ABs.      Entered: Marcus Arguelles 07/22/2019, 11:30 AM              Attestation:  I have reviewed today's vital signs, notes, medications, labs and imaging.     Marcus Arguelles MD

## 2019-07-22 NOTE — PLAN OF CARE
VS: BP elevated, otherwise VSS. Denies CP/SOB   O2: >90% on RA   Output: Voiding w/o pain or difficulty   Last BM: 7/21/19, loose per pt report   Activity: Up with    Up for meals? No   Skin: Intact ex wounds,dry cracked skin on feet   Pain: Pt reported he had a headache, tylenol given. Denied any other pain   CMS: Intact   Dressing: None   Diet: Regular, tolerating well   LDA: PIV in L forearm infusing, LR at 75 ml/hr   Equipment: IV pole   Plan: TBD, IV abx   Additional Info: 1:1 sitter at bedside.  Subutex given x1 for COWS of 9  Zofran given x1 for nausea, symptoms relieved.   Echo in the morning.

## 2019-07-22 NOTE — CONSULTS
"Social Work: Assessment with Discharge Plan    Patient Name:  Jorge Lyle  :  1975  Age:  43 year old  MRN:  [id number]  Risk/Complexity Score:     Completed assessment with:  Pt    Presenting Information   Reason for Referral:  Substance abuse concerns  Date of Intake:  2019  Referral Source:  Physician  Decision Maker:  Patient  Alternate Decision Maker:  Per [facility] policy  Health Care Directive:  Provided education and Declined completing  Living Situation:  Homeless. Has been able to stay with friends but that is not a sober living environment  Previous Functional Status:  Independent  Patient and family understanding of hospitalization:  \"It's for my ankle.\"  Cultural/Language/Spiritual Considerations:   - spiritual, per pt  Adjustment to Illness:  Contemplation phase of change    Physical Health  Reason for Admission:    1. Cellulitis of right lower extremity      Services Needed/Recommended:  Other:  CD treatment    Mental Health/Chemical Dependency  Diagnosis:  Opiate use disorder  Support/Services in Place:  None identified  Services Needed/Recommended:  Pending CD evaluation    Support System  Significant relationship at present time:  Friends  Family of origin is available for support:  No  Other support available:  None identified  Gaps in support system:  Sober support  Patient is caregiver to:  None     Provider Information   Primary Care Physician:  No Ref-Primary, Physician   None   Clinic:  No address on file      :  N/A    Financial   Income Source:  Not identified  Financial Concerns:  Pt is homeless  Insurance:    Payor/Plan Subscriber Name Rel Member # Group #   [employer] - [employer] [facility]* [facility] [id number] [facility]      [address]       Discharge Plan   Patient and family discharge goal:  Pending. Pt very drowsy, not able to engage re discharge planning at this time.  Provided education on discharge plan:  YES  Patient agreeable to discharge " plan:  NO  A list of Medicare Certified Facilities was provided to the patient and/or family to encourage patient choice. Patient's choices for facility are:  N/A  Will NH provide Skilled rehabilitation or complex medical:  NO  General information regarding anticipated insurance coverage and possible out of pocket cost was discussed. Patient and patient's family are aware patient may incur the cost of transportation to the facility, pending insurance payment: NO  Barriers to discharge:  Complete withdrawal protocols, medical readiness    Discharge Recommendations   Anticipated Disposition:  Home, no needs identified  Transportation Needs:  Other:  TBD  Name of Transportation Company and Phone:  N/A    Additional comments   SW met 1:1 w/ pt in pt's room. Introduced self and role of SW. Pt identified that his ankle is the reason he is here, but did identify that he wants CD treatment. Pt presented as very drowsy and was not able to fully engage with SW.    LEONARDA Gutierres, [facility]  Float   Covering 8A & 10A  P: [phone]  Pager: [phone] or [phone]

## 2019-07-22 NOTE — PLAN OF CARE
VS: BP elevated Moonlighter paged, said to TKO fluids and page again if SBP >160 or DBP >100 otherwise VSS. Denies CP/SOB   O2: >90% on RA   Output: Voiding w/o pain or difficulty   Last BM: 7/21/19, loose per pt report   Activity: SBA   Up for meals? No   Skin: Intact ex wounds,dry cracked skin on feet   Pain: Denied pain    CMS: Intact   Dressing: None   Diet: Regular, tolerating well   LDA: PIV in L forearm infusing   Equipment: IV pole   Plan: TBD, IV abx   Additional Info: 1:1 sitter at bedside.  Echo in the morning.

## 2019-07-23 LAB
CREAT SERPL-MCNC: 0.5 MG/DL (ref 0.66–1.25)
GFR SERPL CREATININE-BSD FRML MDRD: >90 ML/MIN/{1.73_M2}
HBV SURFACE AB SERPL IA-ACNC: 0 M[IU]/ML
HBV SURFACE AG SERPL QL IA: NONREACTIVE
PLATELET # BLD AUTO: 478 10E9/L (ref 150–450)

## 2019-07-23 PROCEDURE — 87522 HEPATITIS C REVRS TRNSCRPJ: CPT | Performed by: PEDIATRICS

## 2019-07-23 PROCEDURE — 82565 ASSAY OF CREATININE: CPT | Performed by: PEDIATRICS

## 2019-07-23 PROCEDURE — 25000132 ZZH RX MED GY IP 250 OP 250 PS 637: Performed by: INTERNAL MEDICINE

## 2019-07-23 PROCEDURE — 25000128 H RX IP 250 OP 636: Performed by: PEDIATRICS

## 2019-07-23 PROCEDURE — 25000128 H RX IP 250 OP 636

## 2019-07-23 PROCEDURE — 12000001 ZZH R&B MED SURG/OB UMMC

## 2019-07-23 PROCEDURE — 85049 AUTOMATED PLATELET COUNT: CPT | Performed by: PEDIATRICS

## 2019-07-23 PROCEDURE — 99232 SBSQ HOSP IP/OBS MODERATE 35: CPT | Performed by: INTERNAL MEDICINE

## 2019-07-23 PROCEDURE — 36415 COLL VENOUS BLD VENIPUNCTURE: CPT | Performed by: PEDIATRICS

## 2019-07-23 RX ORDER — PANTOPRAZOLE SODIUM 40 MG/1
40 TABLET, DELAYED RELEASE ORAL
Status: DISCONTINUED | OUTPATIENT
Start: 2019-07-23 | End: 2019-08-02 | Stop reason: HOSPADM

## 2019-07-23 RX ADMIN — CLOTRIMAZOLE: 10 SOLUTION TOPICAL at 13:28

## 2019-07-23 RX ADMIN — BUPRENORPHINE HCL 4 MG: 2 TABLET SUBLINGUAL at 08:27

## 2019-07-23 RX ADMIN — VANCOMYCIN HYDROCHLORIDE 1000 MG: 1 INJECTION, SOLUTION INTRAVENOUS at 13:27

## 2019-07-23 RX ADMIN — PANTOPRAZOLE SODIUM 40 MG: 40 TABLET, DELAYED RELEASE ORAL at 18:09

## 2019-07-23 RX ADMIN — ENOXAPARIN SODIUM 40 MG: 40 INJECTION SUBCUTANEOUS at 08:27

## 2019-07-23 RX ADMIN — BUPRENORPHINE HCL 4 MG: 2 TABLET SUBLINGUAL at 20:25

## 2019-07-23 RX ADMIN — CLOTRIMAZOLE: 10 SOLUTION TOPICAL at 22:44

## 2019-07-23 RX ADMIN — METOPROLOL TARTRATE 25 MG: 25 TABLET, FILM COATED ORAL at 08:27

## 2019-07-23 RX ADMIN — METOPROLOL TARTRATE 25 MG: 25 TABLET, FILM COATED ORAL at 20:24

## 2019-07-23 RX ADMIN — VANCOMYCIN HYDROCHLORIDE 1000 MG: 1 INJECTION, SOLUTION INTRAVENOUS at 05:33

## 2019-07-23 ASSESSMENT — ACTIVITIES OF DAILY LIVING (ADL)
ADLS_ACUITY_SCORE: 9

## 2019-07-23 ASSESSMENT — MIFFLIN-ST. JEOR: SCORE: 1670.13

## 2019-07-23 NOTE — PROGRESS NOTES
"[facility] Internal Medicine Progress Note            Interval History:   Record reviewed including ID consult.  Seen with RN. Remains on IV vancomycin.   Indicates feeling well.  WD symptoms controlled with sebutex.  Indicates up to BR with stable gait and no LH.  No CP, SOB, cough.  Mild nausea on occasion pc, no reflux, abd pain.  Tolerating diet.  Formed BM today.  Voiding OK.  Denies depression or anxiety.             Medications:   All medications reviewed today          Physical Exam:   Blood pressure (!) 151/100, pulse 83, temperature 95.9  F (35.5  C), temperature source Oral, resp. rate 16, height 1.803 m (5' 11\"), weight 74.3 kg (163 lb 12.8 oz), SpO2 99 %.    Intake/Output Summary (Last 24 hours) at 2019 1629  Last data filed at 2019 1149  Gross per 24 hour   Intake 450 ml   Output 400 ml   Net 50 ml       General:  Alert.   Fluent speech.  Cooperative.  Orientated.   No clinical distress.   No O2.      Heent:       Neck:    Skin:    Chest:  clear    Cardiac:  Reg without gallop, murmur.  No JVD.     Abdomen:  Non distended, soft, mild epigastric tenderness.  No guarding, organomegaly.   BS normal.     Extremities:  Perfused.  RLE - circumferential swelling essentially resolved.  No appreciable erythema.  No calf, posterior thigh tenderness to suggest DVT.      Neuro:  No tremor.             Data:     Results for orders placed or performed during the hospital encounter of 19 (from the past 24 hour(s))   Echo Complete    Narrative    [id number]  [id number]  [id number]  [id number]^JUNIOR^STEVE^H           [facility],[place]  Echocardiography Laboratory  [address] [place] [id number]     Name: JORGE PHAM  MRN: [id number]  : 1975  Study Date: 2019 02:52 PM  Age: 43 yrs  Gender: Male  Patient Location: [facility]  Reason For Study: Endocarditis  Ordering Physician: STEVE PACHECO  Performed By: Dhaval Chavira RDCS     BSA: 1.9 " m2  Height: 71 in  Weight: 163 lb  BP: 158/96 mmHg  _____________________________________________________________________________  __        Procedure  Complete Portable Echo Adult.  _____________________________________________________________________________  __        Interpretation Summary  No vegetation or mass identified, however this does not exclude endocarditis.  No significant valvular abnormalities were noted. Right ventricular function,  chamber size, wall motion, and thickness are normal.  _____________________________________________________________________________  __        Left Ventricle  Global and regional left ventricular function is normal with an EF of 55-60%.  Left ventricular wall thickness is normal. Left ventricular size is normal.  Left ventricular diastolic function is normal. No regional wall motion  abnormalities are seen.     Right Ventricle  Right ventricular function, chamber size, wall motion, and thickness are  normal.     Atria  Both atria appear normal. The atrial septum is intact as assessed by color  Doppler .        Mitral Valve  The mitral valve is normal.     Aortic Valve  Aortic valve is normal in structure and function.     Tricuspid Valve  The tricuspid valve is normal. Trace to mild tricuspid insufficiency is  present. Pulmonary artery systolic pressure cannot be assessed.     Pulmonic Valve  The pulmonic valve is normal.     Vessels  The aorta root cannot be assessed. The pulmonary artery and bifurcation cannot  be assessed. The inferior vena cava is normal.     Pericardium  No pericardial effusion is present.        Compared to Previous Study  Previous study not available for comparison.  _____________________________________________________________________________  __     MMode/2D Measurements & Calculations  RVDd: 4.4 cm  IVSd: 0.80 cm  LVIDd: 5.3 cm  LVIDs: 3.7 cm  LVPWd: 0.89 cm  FS: 30.5 %  LV mass(C)d: 159.0 grams  LV mass(C)dI: 82.2 grams/m2  Ao Arch Diam  (Proximal trans.): 3.4 cm  LVOT diam: 2.6 cm  LVOT area: 5.2 cm2  LA Volume (BP): 58.0 ml  LA Volume Index (BP): 30.1 ml/m2  RWT: 0.34           Doppler Measurements & Calculations  MV E max neil: 55.7 cm/sec  MV A max neil: 50.3 cm/sec  MV E/A: 1.1  MV dec time: 0.19 sec  E/E' av.8  Lateral E/e': 4.0  Medial E/e': 7.6     _____________________________________________________________________________  __           Report approved by: Saroj Martínez 2019 03:48 PM      Vancomycin level   Result Value Ref Range    Vancomycin Level 6.1 mg/L   Creatinine   Result Value Ref Range    Creatinine 0.50 (L) 0.66 - 1.25 mg/dL    GFR Estimate >90 >60 mL/min/[1.73_m2]    GFR Estimate If Black >90 >60 mL/min/[1.73_m2]   Platelet count   Result Value Ref Range    Platelet Count 478 (H) 150 - 450 10e9/L                 Assessment and Plan:   1)  RLE cellulitis dating back to  incompletely or inadequately treated.  Essentially resolved on IV vanco.  Neg follow up doppler for DVT.   2)  MRSA bacteremia documented .  Clinically not toxic or septic appearing.  On IV vanco.  Neg Echo.  Planned 4 week course IV ABs.   3)  IV heroin dependence, continuous.  WD controlled on subutex.    4)  HTN per record.  Sub-optimal on low dose lopressor.    5)  Depression, anxiety per record. Appears compensated.   6)  Altered MS with sedation, mild confusion suspect related to substance use.  Resolved.   7)  Peripheral neuropathy per record previously on neurontin.   8)  Nausea pc with epigastric tenderness possibly acid peptic. Normal hepatic function.     PLAN:  1)   Continue IV vanco.  2)  Monitor COWs.  Sebutex prn.   3)  CD consult.  4)  Increase Lopressor 25 mg BID.  5)  Add empiric protonix.  6)  Trend labs.  Monitor clinically.   Disposition Plan   Expected discharge pending CD evaluation after 4 weeks IV ABs.      Entered: Marcus Arguelles 2019, 1:49 PM              Attestation:  I have reviewed today's  vital signs, notes, medications, labs and imaging.     Marcus Arguelles MD

## 2019-07-23 NOTE — PLAN OF CARE
VS: VSS and afebrile.    O2: Room air saturations 99%.    Output: Voided spontaneously without difficulty per pt report   Last BM:  7/22 and passing gas   Activity: Up independently    Skin: Both lower legs and feet have flaky skin and swelling.   Pain: Denies   CMS: Intact   Dressing: NA   Diet: Regular. Good appetite   LDA: OLD IV: extravasated,protocol initiated, MD aware. No orders.  Pt is hard stick,new IV inserted by anesthesia.   PIV: TKO between abx.    Equipment: IV pole   Plan: Detox   Additional Info: Patient has a valuables envelope in security.  Contact precaution maintain.   Wt: 74.3 kg

## 2019-07-23 NOTE — PLAN OF CARE
Patient A&O x4, lungs sound clear, Bowel sound active-passing gas , Denied CP, lightheadedness, dizziness, numbness, tingling and SOB, iv TKO, drinking well and voiding spontaneously without difficulties, able to wiggle toes, denied pain at this time, both feet cracked and right foot swollen but improving, ate and tolerated well, COWS score was 2, Subutex given as ordered, took shower, linen changed, lotion applied to feet as per order, isolation precaution maintained, demonstrates the ability to use call light appropriately, will continue to monitor patient.

## 2019-07-23 NOTE — PHARMACY-VANCOMYCIN DOSING SERVICE
Pharmacy Vancomycin Note  Date of Service 2019  Patient's  1975   43 year old, male    Indication: Bacteremia  Goal Trough Level: 10-15 mg/L  Day of Therapy: - present  Current Vancomycin regimen:  1250 mg IV q12h    Current estimated CrCl = Estimated Creatinine Clearance: 181.9 mL/min (A) (based on SCr of 0.56 mg/dL (L)).    Creatinine for last 3 days  2019:  4:37 AM Creatinine 0.70 mg/dL; 10:59 AM Creatinine 0.60 mg/dL  2019:  7:50 AM Creatinine 0.47 mg/dL  2019:  7:09 AM Creatinine 0.56 mg/dL    Recent Vancomycin Levels (past 3 days)  2019:  5:06 PM Vancomycin Level 6.1 mg/L    Vancomycin IV Administrations (past 72 hours)                   vancomycin (VANCOCIN) 1,250 mg in sodium chloride 0.9 % 250 mL intermittent infusion (mg) 1,250 mg New Bag 19 1835     1,250 mg New Bag  0616     1,250 mg New Bag 19 1848     1,250 mg New Bag  0618     1,250 mg New Bag 19 1805                Nephrotoxins and other renal medications (From now, onward)    Start     Dose/Rate Route Frequency Ordered Stop    19 0235  vancomycin (VANCOCIN) 1000 mg in dextrose 5% 200 mL PREMIX      1,000 mg  200 mL/hr over 1 Hours Intravenous EVERY 8 HOURS 19               Contrast Orders - past 72 hours (72h ago, onward)    None          Interpretation of levels and current regimen:  Trough level is  Subtherapeutic    Has serum creatinine changed > 50% in last 72 hours: No    Urine output:  unable to determine    Renal Function: Stable    Plan:  1.  Increase Dose to 1000mg Q8H  2.  Pharmacy will check trough levels as appropriate in 1-3 Days.    3. Serum creatinine levels will be ordered a minimum of twice weekly.      Reginald Pena, PharmD  PGY1 Pharmacy Resident          .

## 2019-07-23 NOTE — PROGRESS NOTES
"Had been waiting for pharmacy to verify metoprolol medication- \"pharmacist called MD to clarify if medication is extended release or not.\"    "

## 2019-07-24 LAB — VANCOMYCIN SERPL-MCNC: 17.6 MG/L

## 2019-07-24 PROCEDURE — 80202 ASSAY OF VANCOMYCIN: CPT | Performed by: PEDIATRICS

## 2019-07-24 PROCEDURE — 40000556 ZZH STATISTIC PERIPHERAL IV START W US GUIDANCE

## 2019-07-24 PROCEDURE — 12000001 ZZH R&B MED SURG/OB UMMC

## 2019-07-24 PROCEDURE — 36415 COLL VENOUS BLD VENIPUNCTURE: CPT | Performed by: PEDIATRICS

## 2019-07-24 PROCEDURE — 25000132 ZZH RX MED GY IP 250 OP 250 PS 637: Performed by: INTERNAL MEDICINE

## 2019-07-24 PROCEDURE — 25000128 H RX IP 250 OP 636: Performed by: PEDIATRICS

## 2019-07-24 PROCEDURE — 99231 SBSQ HOSP IP/OBS SF/LOW 25: CPT | Performed by: INTERNAL MEDICINE

## 2019-07-24 PROCEDURE — 25000128 H RX IP 250 OP 636

## 2019-07-24 PROCEDURE — 40000257 ZZH STATISTIC CONSULT NO CHARGE VASC ACCESS

## 2019-07-24 RX ORDER — AMLODIPINE BESYLATE 2.5 MG/1
2.5 TABLET ORAL DAILY
Status: DISCONTINUED | OUTPATIENT
Start: 2019-07-24 | End: 2019-08-02

## 2019-07-24 RX ADMIN — METOPROLOL TARTRATE 25 MG: 25 TABLET, FILM COATED ORAL at 19:23

## 2019-07-24 RX ADMIN — PANTOPRAZOLE SODIUM 40 MG: 40 TABLET, DELAYED RELEASE ORAL at 17:01

## 2019-07-24 RX ADMIN — BUPRENORPHINE HCL 4 MG: 2 TABLET SUBLINGUAL at 07:59

## 2019-07-24 RX ADMIN — VANCOMYCIN HYDROCHLORIDE 1000 MG: 1 INJECTION, SOLUTION INTRAVENOUS at 11:56

## 2019-07-24 RX ADMIN — ENOXAPARIN SODIUM 40 MG: 40 INJECTION SUBCUTANEOUS at 07:59

## 2019-07-24 RX ADMIN — CLOTRIMAZOLE: 10 SOLUTION TOPICAL at 19:23

## 2019-07-24 RX ADMIN — BUPRENORPHINE HCL 4 MG: 2 TABLET SUBLINGUAL at 19:24

## 2019-07-24 RX ADMIN — VANCOMYCIN HYDROCHLORIDE 1000 MG: 1 INJECTION, SOLUTION INTRAVENOUS at 17:01

## 2019-07-24 RX ADMIN — PANTOPRAZOLE SODIUM 40 MG: 40 TABLET, DELAYED RELEASE ORAL at 07:59

## 2019-07-24 RX ADMIN — CLOTRIMAZOLE: 10 SOLUTION TOPICAL at 15:33

## 2019-07-24 RX ADMIN — METOPROLOL TARTRATE 25 MG: 25 TABLET, FILM COATED ORAL at 07:59

## 2019-07-24 RX ADMIN — AMLODIPINE BESYLATE 2.5 MG: 2.5 TABLET ORAL at 15:33

## 2019-07-24 RX ADMIN — VANCOMYCIN HYDROCHLORIDE 1000 MG: 1 INJECTION, SOLUTION INTRAVENOUS at 02:15

## 2019-07-24 ASSESSMENT — ACTIVITIES OF DAILY LIVING (ADL)
ADLS_ACUITY_SCORE: 9
ADLS_ACUITY_SCORE: 10

## 2019-07-24 NOTE — PROGRESS NOTES
Care Coordinator Progress Note    Admission Date/Time:  7/20/2019  Attending MD:  Braden Barajas MD    Data  Chart reviewed, discussed with interdisciplinary team.   Patient was admitted for: Cellulitis of right lower extremity.    Concerns with insurance coverage for discharge needs: None.  Current Living Situation: Patient is homeless.  Support System: Uninvolved  Services Involved: Skilled Nursing Facility, Long term care  Transportation at Discharge: D  Transportation to Medical Appointments:   - Not applicable  Barriers to Discharge: homeless and substance abuse    Coordination of Care and Referrals: Referral made to [facility] for IV antibiotic therapy. RNCC will continue to follow.    CHETNA [place] Liaison  Phone [phone]     Plan  Anticipated Discharge Date:  TBD  Anticipated Discharge Plan:  TBD    Ember Marquez RN, BSN  Care Coordinator, 8A  Phone [phone]  Pager [phone]

## 2019-07-24 NOTE — PLAN OF CARE
R PIV infusing vanco & infiltrated @ 1730. RN flyer paged; IV insertion attempted but unable to get IV access. Anesthesiology then paged; anesthesia team unable to obtain IV access as well. Moonlighter then notified. Per gokul, have nurse attempt IV insertion. Peds anesthesia then came and able to insert PIV in R AC

## 2019-07-24 NOTE — PROGRESS NOTES
Patient has been educated on potential risks of choosing to leave the unit and that the responsibility for patient well-being will belong to the patient. Pt has been informed that admission to hospital is due to need for medical treatment. Education given to the patient on some of the potential risks included but are not limited to:      - lack of access to nursing intervention      - possible missed appointments with MD, therapies, tests      - possible missed medications, antibiotics, management of IV's    Patient Response: I will be back soon and take the IV pole with me    Patient notified staff prior to leaving unit: yes  Coban wrap placed over IV prior to pt leaving unit: no Vanco running

## 2019-07-24 NOTE — PROGRESS NOTES
"[facility] Internal Medicine Progress Note            Interval History:   Record reviewed.  Seen with RN. Remains on IV vancomycin.   Indicates feeling well.  WD symptoms controlled with sebutex.  Indicates ambulatory without  LH.  No CP, SOB, cough, nausea,  reflux, abd pain.  Tolerating diet.  Formed BM today.  Voiding OK.             Medications:   All medications reviewed today          Physical Exam:   Blood pressure (!) 154/103, pulse 73, temperature 97.4  F (36.3  C), temperature source Oral, resp. rate 16, height 1.803 m (5' 11\"), weight 74.3 kg (163 lb 12.8 oz), SpO2 100 %.    Intake/Output Summary (Last 24 hours) at 7/20/2019 1629  Last data filed at 7/20/2019 1149  Gross per 24 hour   Intake 450 ml   Output 400 ml   Net 50 ml       General:  Alert.   Fluent speech.  Cooperative.  Orientated.   No clinical distress.   No O2.      Heent:       Neck:    Skin:    Chest:  clear    Cardiac:  Reg without gallop, murmur.  No JVD.     Abdomen:  Non distended, soft, mild epigastric tenderness.  No guarding, organomegaly.   BS normal.     Extremities:  Perfused.  RLE - circumferential swelling improved..  No appreciable erythema.  No calf, posterior thigh tenderness to suggest DVT.      Neuro:  No tremor.             Data:     No results found for this or any previous visit (from the past 24 hour(s)).   7/20  Last Comprehensive Metabolic Panel:  Sodium   Date Value Ref Range Status   07/22/2019 140 133 - 144 mmol/L Final     Potassium   Date Value Ref Range Status   07/22/2019 3.5 3.4 - 5.3 mmol/L Final     Chloride   Date Value Ref Range Status   07/22/2019 110 (H) 94 - 109 mmol/L Final     Carbon Dioxide   Date Value Ref Range Status   07/22/2019 24 20 - 32 mmol/L Final     Anion Gap   Date Value Ref Range Status   07/22/2019 6 3 - 14 mmol/L Final     Glucose   Date Value Ref Range Status   07/22/2019 99 70 - 99 mg/dL Final     Urea Nitrogen   Date Value Ref Range Status   07/22/2019 4 (L) 7 - 30 " mg/dL Final     Creatinine   Date Value Ref Range Status   07/23/2019 0.50 (L) 0.66 - 1.25 mg/dL Final     GFR Estimate   Date Value Ref Range Status   07/23/2019 >90 >60 mL/min/[1.73_m2] Final     Comment:     Non  GFR Calc  Starting 12/18/2018, serum creatinine based estimated GFR (eGFR) will be   calculated using the Chronic Kidney Disease Epidemiology Collaboration   (CKD-EPI) equation.       Calcium   Date Value Ref Range Status   07/22/2019 8.4 (L) 8.5 - 10.1 mg/dL Final     CRP 34.8 7/22         Assessment and Plan:   1)  RLE cellulitis dating back to 6/8 incompletely or inadequately treated.  Essentially resolved on IV vanco.  Neg follow up doppler for DVT.   2)  MRSA bacteremia documented 6/8.  Clinically not toxic or septic appearing.  On IV vanco.  Neg Echo.  Planned 4 week course IV ABs.   3)  IV heroin dependence, continuous.  WD controlled on subutex.    4)  HTN per record.  Sub-optimal despite increase lopressor.    5)  Depression, anxiety per record. Appears compensated.   6)  Altered MS with sedation, mild confusion suspect related to substance use.  Resolved.   7)  Peripheral neuropathy per record previously on neurontin.   8)  Nausea pc with epigastric tenderness possibly acid peptic. Normal hepatic function. Resolved.  On PPI.     PLAN:  1)   Continue IV vanco.  2)  Monitor COWs.  Sebutex prn.   3)  CD consult pending.  4) Add amlodipine 2.5 mg daily. Continue Lopressor 25 mg BID.  5)  On empiric protonix.  6)  Trend labs.  Monitor clinically.   Disposition Plan   Expected discharge pending CD evaluation after 4 weeks IV ABs.  Care Coordinator looking into [place].      Entered: Marcus Arguelles 07/24/2019, 3:10 PM              Attestation:  I have reviewed today's vital signs, notes, medications, labs and imaging.     Marcus Arguelles MD

## 2019-07-24 NOTE — PLAN OF CARE
Pt. A&Ox4. VSS, ex elevated BP, Metoprolol given. Afebrile. Lung sounds CTA. Maintaining sats on RA. Bowel sounds active, LBM 7/23. PP+ DP+. CMS and neuro's are intact. Denies numbness and tingling in all extremities. Denies nausea, shortness of breath, and chest pain. Denies pain. Voids spontaneously without difficulty in the BR. Tolerating regular diet. BLE edema and flaky skin. Pt up independently. PIV is patent and infusing TKO between abx. Pt off unit for almost 3 hrs, vanco rescheduled per pharmacist. Call light is within reach, pt able to make needs known and is resting comfortably between cares. Will continue to monitor.

## 2019-07-24 NOTE — PLAN OF CARE
VS: VSS ex DBP slightly elevated    O2: >90% on room air    Output: Voiding spontaneously and fluids encouraged    Last BM: 7/22/19    Activity: Up ad cristian    Up for meals? N/a    Skin: CDI ex BLE    Pain: Pt denies    CMS: intact   Dressing: N/a    Diet: Regular    LDA: PIV running tko in between antibiotics    Equipment: IV pump and pole    Plan: Continue POC    Additional Info: COWS score 1 at 1800

## 2019-07-24 NOTE — PLAN OF CARE
Patient A & O x4. Pt up independently. Lung sounds clear. Patient denies chest pain, SOB, lightheadedness, dizziness, tingling, numbness, nausea, and vomiting. Bowel sounds active, last BM 7/23, passing flatus, and tolerating regular diet. Drinking well and voiding spontaneously without difficulties. PIV infusing in L. Patient able to wiggle toes. CMS intact. Pt does foot soaks independently, but has been sleeping majority of the day shift, so he has not done it. Denies pain. COWS 0. Pt waiting to see chem dep counselor until next week. Plan is a 4 wk course of abx and chemical dep tx. Patient demonstrates ability to use call light appropriately. Will continue to monitor patient.

## 2019-07-24 NOTE — PROGRESS NOTES
"Patient has been educated on potential risks of choosing to leave the unit and that the responsibility for patient well-being will belong to the patient. Pt has been informed that admission to hospital is due to need for medical treatment. Education given to the patient on some of the potential risks included but are not limited to:      - lack of access to nursing intervention      - possible missed appointments with MD, therapies, tests      - possible missed medications, antibiotics, management of IV's    Patient Response: \"I am just going out to smoke\"    Patient notified staff prior to leaving unit: Sometimes  Coban wrap placed over IV prior to pt leaving unit: When aware pt is leaving.    "

## 2019-07-24 NOTE — PROGRESS NOTES
7/24/2019    Per chart review, pt is on 4 weeks of IV antibiotics. Due to high CD consult volume, pt will not be seen until next week Monday or Tuesday as he will still be in the hospital.    Milagros Hong, [facility][note 3]  [phone]

## 2019-07-25 LAB
HCV RNA SERPL NAA+PROBE-ACNC: ABNORMAL [IU]/ML
HCV RNA SERPL NAA+PROBE-LOG IU: 6.7 LOG IU/ML
VANCOMYCIN SERPL-MCNC: 15.8 MG/L

## 2019-07-25 PROCEDURE — 25800030 ZZH RX IP 258 OP 636

## 2019-07-25 PROCEDURE — 99232 SBSQ HOSP IP/OBS MODERATE 35: CPT | Performed by: INTERNAL MEDICINE

## 2019-07-25 PROCEDURE — 25000132 ZZH RX MED GY IP 250 OP 250 PS 637: Performed by: INTERNAL MEDICINE

## 2019-07-25 PROCEDURE — 36415 COLL VENOUS BLD VENIPUNCTURE: CPT | Performed by: PEDIATRICS

## 2019-07-25 PROCEDURE — 99207 ZZC CDG-MDM COMPONENT: MEETS MODERATE - UP CODED: CPT | Performed by: INTERNAL MEDICINE

## 2019-07-25 PROCEDURE — 25000128 H RX IP 250 OP 636

## 2019-07-25 PROCEDURE — 25000128 H RX IP 250 OP 636: Performed by: PEDIATRICS

## 2019-07-25 PROCEDURE — 80202 ASSAY OF VANCOMYCIN: CPT | Performed by: PEDIATRICS

## 2019-07-25 PROCEDURE — 12000001 ZZH R&B MED SURG/OB UMMC

## 2019-07-25 RX ORDER — VANCOMYCIN HYDROCHLORIDE 1 G/200ML
1000 INJECTION, SOLUTION INTRAVENOUS EVERY 8 HOURS
Status: DISCONTINUED | OUTPATIENT
Start: 2019-07-25 | End: 2019-07-27

## 2019-07-25 RX ORDER — SODIUM CHLORIDE 9 MG/ML
INJECTION, SOLUTION INTRAVENOUS
Status: COMPLETED
Start: 2019-07-25 | End: 2019-07-25

## 2019-07-25 RX ADMIN — VANCOMYCIN HYDROCHLORIDE 1000 MG: 1 INJECTION, SOLUTION INTRAVENOUS at 00:56

## 2019-07-25 RX ADMIN — ENOXAPARIN SODIUM 40 MG: 40 INJECTION SUBCUTANEOUS at 09:07

## 2019-07-25 RX ADMIN — BUPRENORPHINE HCL 4 MG: 2 TABLET SUBLINGUAL at 09:05

## 2019-07-25 RX ADMIN — VANCOMYCIN HYDROCHLORIDE 1000 MG: 1 INJECTION, SOLUTION INTRAVENOUS at 16:15

## 2019-07-25 RX ADMIN — BUPRENORPHINE HCL 4 MG: 2 TABLET SUBLINGUAL at 20:11

## 2019-07-25 RX ADMIN — AMLODIPINE BESYLATE 2.5 MG: 2.5 TABLET ORAL at 09:05

## 2019-07-25 RX ADMIN — CLOTRIMAZOLE: 10 SOLUTION TOPICAL at 20:11

## 2019-07-25 RX ADMIN — PANTOPRAZOLE SODIUM 40 MG: 40 TABLET, DELAYED RELEASE ORAL at 16:11

## 2019-07-25 RX ADMIN — CLOTRIMAZOLE: 10 SOLUTION TOPICAL at 09:15

## 2019-07-25 RX ADMIN — METOPROLOL TARTRATE 25 MG: 25 TABLET, FILM COATED ORAL at 09:05

## 2019-07-25 RX ADMIN — PANTOPRAZOLE SODIUM 40 MG: 40 TABLET, DELAYED RELEASE ORAL at 09:04

## 2019-07-25 RX ADMIN — SODIUM CHLORIDE: 9 INJECTION, SOLUTION INTRAVENOUS at 16:24

## 2019-07-25 RX ADMIN — VANCOMYCIN HYDROCHLORIDE 1000 MG: 1 INJECTION, SOLUTION INTRAVENOUS at 09:10

## 2019-07-25 ASSESSMENT — ACTIVITIES OF DAILY LIVING (ADL)
ADLS_ACUITY_SCORE: 10

## 2019-07-25 NOTE — PLAN OF CARE
VS:   VSS; alert and oriented x4    COWS score 0    O2: >95% on RA; denies cough/SOB    Output: Voiding spontaneously without difficulty in toilet    Last BM: 7/23/19    Activity:   Up independently    Using IV pole for minimal assistance when leaving the unit   Skin: Intact w/exception of R ankle edema (+1) and erythema. Skin dry and flaky; foot soaks completed x2 this shift and medication applied   Pain: Denies   CMS: Intact; denies n/t    Dressing:   Wound BETSY    Skin intact    Diet: Regular; tolerating well with good appetite    LDA:   See previous note    PIV RUE with IV splint on    Equipment: IV pole    Plan: Continue IV abx course    Additional Info: Flat affect noted. Pt leaving unit to smoke independently. Able to make needs known. Calm and cooperative with nursing cares. Contact precautions maintained      Patient has been educated on potential risks of choosing to leave the unit and that the responsibility for patient well-being will belong to the patient. Pt has been informed that admission to hospital is due to need for medical treatment. Education given to the patient on some of the potential risks included but are not limited to:      - lack of access to nursing intervention      - possible missed appointments with MD, therapies, tests      - possible missed medications, antibiotics, management of IV's    Patient Response: acceptance; verbalized understanding     Patient notified staff prior to leaving unit: yes x1; no x2  Coban wrap placed over IV prior to pt leaving unit: IV splint in place

## 2019-07-25 NOTE — SAFE
"Patient has been educated on potential risks of choosing to leave the unit and that the responsibility for patient well-being will belong to the patient. Pt has been informed that admission to hospital is due to need for medical treatment. Education given to the patient on some of the potential risks included but are not limited to:      - lack of access to nursing intervention      - possible missed appointments with MD, therapies, tests      - possible missed medications, antibiotics, management of IV's    Patient Response: \"Ok\"     Patient notified staff prior to leaving unit: Patient left unit without notifying RN. Patient educated upon return that he must notify staff PRIOR to leaving the unit.   Coban wrap placed over IV prior to pt leaving unit - Coban in place.     "

## 2019-07-25 NOTE — PROGRESS NOTES
"[facility] Internal Medicine Progress Note            Interval History:   Record reviewed. Seen with RN. Remains on IV vancomycin.   Indicates continues to feel well.  WD symptoms controlled with sebutex.  Indicates ambulatory without  LH.  No CP, SOB, cough, nausea,  reflux, abd pain.  Tolerating diet.  Formed BM today.  Voiding OK.  Not accepted at [place].           Medications:   All medications reviewed today          Physical Exam:   Blood pressure 129/83, pulse 69, temperature 96.6  F (35.9  C), temperature source Oral, resp. rate 16, height 1.803 m (5' 11\"), weight 74.3 kg (163 lb 12.8 oz), SpO2 100 %.    Intake/Output Summary (Last 24 hours) at 7/20/2019 1629  Last data filed at 7/20/2019 1149  Gross per 24 hour   Intake 450 ml   Output 400 ml   Net 50 ml       General:  Alert.   Fluent speech.  Cooperative.  Orientated.   No clinical distress.   No O2.      Heent:       Neck:    Skin:    Chest:  clear    Cardiac:  Reg without gallop, murmur.  No JVD.     Abdomen:  Non distended, soft, mild epigastric tenderness.  No guarding, organomegaly.   BS normal.     Extremities:  Perfused.  RLE - circumferential swelling improved..  No appreciable erythema.  No calf, posterior thigh tenderness to suggest DVT.      Neuro:  No tremor.             Data:     Results for orders placed or performed during the hospital encounter of 07/20/19 (from the past 24 hour(s))   Vancomycin level   Result Value Ref Range    Vancomycin Level 17.6 mg/L   Vancomycin level   Result Value Ref Range    Vancomycin Level 15.8 mg/L      7/20  Last Comprehensive Metabolic Panel:  Sodium   Date Value Ref Range Status   07/22/2019 140 133 - 144 mmol/L Final     Potassium   Date Value Ref Range Status   07/22/2019 3.5 3.4 - 5.3 mmol/L Final     Chloride   Date Value Ref Range Status   07/22/2019 110 (H) 94 - 109 mmol/L Final     Carbon Dioxide   Date Value Ref Range Status   07/22/2019 24 20 - 32 mmol/L Final     Anion Gap   Date " Value Ref Range Status   07/22/2019 6 3 - 14 mmol/L Final     Glucose   Date Value Ref Range Status   07/22/2019 99 70 - 99 mg/dL Final     Urea Nitrogen   Date Value Ref Range Status   07/22/2019 4 (L) 7 - 30 mg/dL Final     Creatinine   Date Value Ref Range Status   07/23/2019 0.50 (L) 0.66 - 1.25 mg/dL Final     GFR Estimate   Date Value Ref Range Status   07/23/2019 >90 >60 mL/min/[1.73_m2] Final     Comment:     Non  GFR Calc  Starting 12/18/2018, serum creatinine based estimated GFR (eGFR) will be   calculated using the Chronic Kidney Disease Epidemiology Collaboration   (CKD-EPI) equation.       Calcium   Date Value Ref Range Status   07/22/2019 8.4 (L) 8.5 - 10.1 mg/dL Final     CRP 34.8 7/22         Assessment and Plan:   1)  RLE cellulitis dating back to 6/8 incompletely or inadequately treated.  Essentially resolved on IV vanco.  Neg follow up doppler for DVT.   2)  MRSA bacteremia documented 6/8.  Clinically not toxic or septic appearing.  On IV vanco.  Neg Echo.  Planned 4 week course IV ABs.   3)  IV heroin dependence, continuous.  WD controlled on subutex.    4)  HTN per record.  Improved on increase lopressor and norvasc. .    5)  Depression, anxiety per record. Appears compensated.   6)  Altered MS with sedation, mild confusion suspect related to substance use.  Resolved.   7)  Peripheral neuropathy per record previously on neurontin.   8)  Nausea pc with epigastric tenderness possibly acid peptic. Normal hepatic function. Resolved.  On PPI.     PLAN:  1)   Continue IV vanco.  2)  Monitor COWs.  Sebutex prn.   3)  CD consult pending.  4) Continue amlodipine 2.5 mg and Lopressor 25 mg BID.  5)  On empiric protonix.  6)  Trend labs.  Monitor clinically.   Disposition Plan   Expected discharge pending CD evaluation after 4 weeks IV ABs.  Care Coordinator looking into [place].      Entered: Marcus Arguelles 07/25/2019, 3:31 PM              Attestation:  I have reviewed today's vital  signs, notes, medications, labs and imaging.     Marcus Arguelles MD

## 2019-07-25 NOTE — PHARMACY-VANCOMYCIN DOSING SERVICE
Pharmacy Vancomycin Note  Date of Service 2019  Patient's  1975   43 year old, male    Indication: RLE cellulitis, h/o MRSA Bacteremia  Goal Trough Level: 15-20 mg/L  Day of Therapy: started on 19  Current Vancomycin regimen:  1000 mg IV q8h    Current estimated CrCl = Estimated Creatinine Clearance: 204.3 mL/min (A) (based on SCr of 0.5 mg/dL (L)).    Creatinine for last 3 days  2019:  7:09 AM Creatinine 0.56 mg/dL  2019:  6:18 AM Creatinine 0.50 mg/dL    Recent Vancomycin Levels (past 3 days)  2019:  5:06 PM Vancomycin Level 6.1 mg/L  2019:  4:29 PM Vancomycin Level 17.6 mg/L (~4.5 hrs post dose on q8h frequency)    Vancomycin IV Administrations (past 72 hours)                   vancomycin (VANCOCIN) 1000 mg in dextrose 5% 200 mL PREMIX (mg) 1,000 mg New Bag 19 1701     1,000 mg New Bag  1156     1,000 mg New Bag  0215     1,000 mg New Bag 19 1327     1,000 mg New Bag  0533    vancomycin (VANCOCIN) 1,250 mg in sodium chloride 0.9 % 250 mL intermittent infusion ()  Restarted 19 2129     1,250 mg New Bag  1835     1,250 mg New Bag  0616                Nephrotoxins and other renal medications (From now, onward)    Start     Dose/Rate Route Frequency Ordered Stop    19 0530  vancomycin (VANCOCIN) 1000 mg in dextrose 5% 200 mL PREMIX      1,000 mg  200 mL/hr over 1 Hours Intravenous EVERY 8 HOURS 19               Contrast Orders - past 72 hours (72h ago, onward)    None          Interpretation of levels and current regimen:  Trough level is  Therapeutic. However, the level was drawn 4.5 hours after 1156 vancomycin dose.    Has serum creatinine changed > 50% in last 72 hours: No    Urine output:  unable to determine    Renal Function: Stable    Plan:  1.  Continue Current Dose for now.  2.  Pharmacy will check trough levels as appropriate in 1-3 Days.    3. Serum creatinine levels will be ordered a minimum of twice weekly.      Devan Davidson  PharmD

## 2019-07-25 NOTE — PLAN OF CARE
Pt A&O x's 4. VSS. Afebrile. 02 sats. In the 90s on RA. Lungs clear. Denies SOB, CP and nausea. Tolerating regular diet. Bowel sound active in all quadrants. No BM but passing gas. Voiding adequate amount into the bathroom. Denies pain when asked. CMS intact, denies N/T.  Right ankle noted to be swollen. Pt able to bend ankle and wiggle toes. Continues on IV abx. Pt slept through the night. Pt is able to make needs known, call light with in reach. Will continue to monitor.

## 2019-07-25 NOTE — PHARMACY-VANCOMYCIN DOSING SERVICE
Pharmacy Vancomycin Note  Date of Service 2019  Patient's  1975   43 year old, male    Indication: Bacteremia and Skin and Soft Tissue Infection (vanco RALPH 1)  Goal Trough Level: 10-15 mg/L  Day of Therapy: Started   Current Vancomycin regimen:  1000 mg IV q8h    Current estimated CrCl = Estimated Creatinine Clearance: 204.3 mL/min (A) (based on SCr of 0.5 mg/dL (L)).    Creatinine for last 3 days  2019:  6:18 AM Creatinine 0.50 mg/dL    Recent Vancomycin Levels (past 3 days)  2019:  5:06 PM Vancomycin Level 6.1 mg/L  2019:  4:29 PM Vancomycin Level 17.6 mg/L  2019:  7:58 AM Vancomycin Level 15.8 mg/L    Vancomycin IV Administrations (past 72 hours)                   vancomycin (VANCOCIN) 1000 mg in dextrose 5% 200 mL PREMIX (mg) 1,000 mg New Bag 19 0910    vancomycin (VANCOCIN) 1000 mg in dextrose 5% 200 mL PREMIX (mg) 1,000 mg New Bag 19 0056     1,000 mg New Bag 19 1701     1,000 mg New Bag  1156     1,000 mg New Bag  0215     1,000 mg New Bag 19 1327     1,000 mg New Bag  0533                Nephrotoxins and other renal medications (From now, onward)    Start     Dose/Rate Route Frequency Ordered Stop    19 0915  vancomycin (VANCOCIN) 1000 mg in dextrose 5% 200 mL PREMIX      1,000 mg  200 mL/hr over 1 Hours Intravenous EVERY 8 HOURS 19 0907               Contrast Orders - past 72 hours (72h ago, onward)    None          Interpretation of levels and current regimen:  Trough level is  Supratherapeutic - however, based on kinetics level would be therapeutic at true trough (1 hour from level)    Has serum creatinine changed > 50% in last 72 hours: No    Urine output:  unable to determine    Renal Function: Stable     Kinetics: population k=[id number], level at true trough (1 hour from level drawn) = 13.3    Plan:  1.  Continue Current Dose  2.  Pharmacy will check trough levels as appropriate in 3-5 Days.    3. Serum creatinine levels will be  ordered daily for the first week of therapy and at least twice weekly for subsequent weeks.      Mesha Ravi, PharmD

## 2019-07-26 LAB
BACTERIA SPEC CULT: NO GROWTH
BACTERIA SPEC CULT: NO GROWTH
CREAT SERPL-MCNC: 0.56 MG/DL (ref 0.66–1.25)
GFR SERPL CREATININE-BSD FRML MDRD: >90 ML/MIN/{1.73_M2}
Lab: NORMAL
Lab: NORMAL
PLATELET # BLD AUTO: 446 10E9/L (ref 150–450)
SPECIMEN SOURCE: NORMAL
SPECIMEN SOURCE: NORMAL

## 2019-07-26 PROCEDURE — 99232 SBSQ HOSP IP/OBS MODERATE 35: CPT | Performed by: INTERNAL MEDICINE

## 2019-07-26 PROCEDURE — 40000007 ZZH STATISTIC ADULT CD FACE TO FACE-NO CHRG

## 2019-07-26 PROCEDURE — 99207 ZZC CDG-MDM COMPONENT: MEETS MODERATE - UP CODED: CPT | Performed by: INTERNAL MEDICINE

## 2019-07-26 PROCEDURE — 25000128 H RX IP 250 OP 636: Performed by: PEDIATRICS

## 2019-07-26 PROCEDURE — 82565 ASSAY OF CREATININE: CPT | Performed by: PEDIATRICS

## 2019-07-26 PROCEDURE — 36415 COLL VENOUS BLD VENIPUNCTURE: CPT | Performed by: PEDIATRICS

## 2019-07-26 PROCEDURE — 12000001 ZZH R&B MED SURG/OB UMMC

## 2019-07-26 PROCEDURE — 85049 AUTOMATED PLATELET COUNT: CPT | Performed by: PEDIATRICS

## 2019-07-26 PROCEDURE — 25000132 ZZH RX MED GY IP 250 OP 250 PS 637: Performed by: INTERNAL MEDICINE

## 2019-07-26 RX ADMIN — VANCOMYCIN HYDROCHLORIDE 1000 MG: 1 INJECTION, SOLUTION INTRAVENOUS at 18:48

## 2019-07-26 RX ADMIN — BUPRENORPHINE HCL 4 MG: 2 TABLET SUBLINGUAL at 09:17

## 2019-07-26 RX ADMIN — METOPROLOL TARTRATE 25 MG: 25 TABLET, FILM COATED ORAL at 09:14

## 2019-07-26 RX ADMIN — VANCOMYCIN HYDROCHLORIDE 1000 MG: 1 INJECTION, SOLUTION INTRAVENOUS at 01:21

## 2019-07-26 RX ADMIN — ENOXAPARIN SODIUM 40 MG: 40 INJECTION SUBCUTANEOUS at 09:19

## 2019-07-26 RX ADMIN — CLOTRIMAZOLE: 10 SOLUTION TOPICAL at 20:32

## 2019-07-26 RX ADMIN — AMLODIPINE BESYLATE 2.5 MG: 2.5 TABLET ORAL at 09:14

## 2019-07-26 RX ADMIN — BUPRENORPHINE HCL 4 MG: 2 TABLET SUBLINGUAL at 20:32

## 2019-07-26 RX ADMIN — PANTOPRAZOLE SODIUM 40 MG: 40 TABLET, DELAYED RELEASE ORAL at 09:16

## 2019-07-26 RX ADMIN — PANTOPRAZOLE SODIUM 40 MG: 40 TABLET, DELAYED RELEASE ORAL at 18:48

## 2019-07-26 RX ADMIN — CLOTRIMAZOLE: 10 SOLUTION TOPICAL at 09:19

## 2019-07-26 RX ADMIN — METOPROLOL TARTRATE 25 MG: 25 TABLET, FILM COATED ORAL at 20:32

## 2019-07-26 RX ADMIN — VANCOMYCIN HYDROCHLORIDE 1000 MG: 1 INJECTION, SOLUTION INTRAVENOUS at 09:20

## 2019-07-26 ASSESSMENT — ACTIVITIES OF DAILY LIVING (ADL)
ADLS_ACUITY_SCORE: 10
ADLS_ACUITY_SCORE: 12

## 2019-07-26 NOTE — PROGRESS NOTES
Patient has been educated on potential risks of choosing to leave the unit and that the responsibility for patient well-being will belong to the patient. Pt has been informed that admission to hospital is due to need for medical treatment. Education given to the patient on some of the potential risks included but are not limited to:      - lack of access to nursing intervention      - possible missed appointments with MD, therapies, tests      - possible missed medications, antibiotics, management of IV's    Patient Response: pt said he needed to go to the store for a cellphone across the street and was persistent even after this writer highly advised not to and told pt risks of leaving unit. Pt stated he would only be gone for 30 minutes.     Patient notified staff prior to leaving unit: yes   Coban wrap placed over IV prior to pt leaving unit yes over IV protector
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
## 2019-07-26 NOTE — PLAN OF CARE
VS: VSS. AAX4. Pt reports mild nausea.   O2: O2 >90 on ra   Output: Voiding adequately   Last BM: LBM 7/25/2019, experienced loose stools    Activity: Up ad cristian, pushes IV pole    Skin: Skin WDL ex blanchable redness on R foot with cracked/flaky skin & blanchable redness on L forearm. Noted edema +1 BLE.   Pain: Denies   CMS: Intact   Dressing: N/A   Diet: Tolerating regular diet   LDA PIV R, keep TKO per hard stick   Equipment: IV pole , strapped sleeve over IV   Plan: TBD, possibly [facility]. Continue to monitor.   Additional Info: COWS score 5 overnight. Pt has flat affect, says very few words/mumbles. Pt leaves unit to smoke, brings IV pole and has been told to notify staff before leaving. Pt is confidential, requests no information be given to family/friends.

## 2019-07-26 NOTE — PLAN OF CARE
"  VS: /81 (BP Location: Left arm)   Pulse 79   Temp 96.3  F (35.7  C) (Axillary)   Resp 16   Ht 1.803 m (5' 11\")   Wt 74.3 kg (163 lb 12.8 oz)   SpO2 98%   BMI 22.85 kg/m       O2: Room air   Output: Up to bathroom   Last BM: 7/25/2019   Activity: Independent   Up for meals? Yes   Skin: Redness and edema on feet continues to resolve   Pain: Denies   CMS: Intact   Dressing: None   Diet: regular   LDA: PIV, keep TKO (patient is a hard stick)   Equipment: IV pole   Plan: To [facility]?   Additional Info: COWS scores remained around 7. Pt ate two full dinners, then a sandwich from the kitchen in the evening. Continues to leave the unit to smoke--needs reminders to alert staff. However, he is conscientious about taking his IV pole with him. Pt cooperated with foot soaks and all cares this evening. Made needs known. Continues to present with a flat affect.          "

## 2019-07-26 NOTE — PROGRESS NOTES
Patient has been educated on potential risks of choosing to leave the unit and that the responsibility for patient well-being will belong to the patient. Pt has been informed that admission to hospital is due to need for medical treatment. Education given to the patient on some of the potential risks included but are not limited to:      - lack of access to nursing intervention      - possible missed appointments with MD, therapies, tests      - possible missed medications, antibiotics, management of IV's    Patient Response: verbalized understanding of need to notify staff of leaving unit and risks of leaving unit    Patient notified staff prior to leaving unit: no  Coban wrap placed over IV prior to pt leaving unit has wrap around IV

## 2019-07-26 NOTE — PROGRESS NOTES
Care Coordinator UPdated  Progress Note    Admission Date/Time:  7/20/2019  Attending MD:  Braden Barajas MD      Coordination of Care: This RNCC spoke with Alycia at [facility], patient needs to pursue [facility] first, if he is not a candidate for [facility] we can contact Alycia and re review if this patient would be a candidate for their facility.    Eunice Gibson BSN RN CCM  RN Care Coordinator [address] [place] [id number]  [email or web address]  Office: [phone]  Pager: [phone]    To contact Weekend RNCC, dial [phone] and enter job code [phone] at prompt. This pager can not be contacted by text page or outside line.
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
## 2019-07-26 NOTE — CONSULTS
7/26/2019    Writer met with pt and completed CD assessment. Pt is open to [facility] for treatment. Pt is limited while he is on IV antibiotics for CD treatment, pt verbalized his understanding. Pt reported he would call if he had further questions.     Milagros Hong, [facility]  [phone]

## 2019-07-26 NOTE — PLAN OF CARE
VS: VSS, BP elevated given scehduled BP meds per MAR, COWS score 1     O2: >90% on room air    Output: Voiding spotnaneously and fluids encouraged    Last BM: 7/26/19 - loose overnight but now formed again    Activity: Up ad cristian    Up for meals? N/a    Skin: CDI ex RLE blanchable redness    Pain: Pt denies    CMS: intact   Dressing: N/a    Diet: Regular    LDA: PIV running TKO    Equipment: IV pump and pole    Plan: Continue to monitor    Additional Info: Pt leaves unit often and sometimes notifies staff. See daily progress note

## 2019-07-27 LAB — VANCOMYCIN SERPL-MCNC: 17.1 MG/L

## 2019-07-27 PROCEDURE — 80202 ASSAY OF VANCOMYCIN: CPT | Performed by: PEDIATRICS

## 2019-07-27 PROCEDURE — 99232 SBSQ HOSP IP/OBS MODERATE 35: CPT | Performed by: INTERNAL MEDICINE

## 2019-07-27 PROCEDURE — 25000128 H RX IP 250 OP 636

## 2019-07-27 PROCEDURE — 25000132 ZZH RX MED GY IP 250 OP 250 PS 637: Performed by: INTERNAL MEDICINE

## 2019-07-27 PROCEDURE — 36415 COLL VENOUS BLD VENIPUNCTURE: CPT | Performed by: PEDIATRICS

## 2019-07-27 PROCEDURE — 12000001 ZZH R&B MED SURG/OB UMMC

## 2019-07-27 PROCEDURE — 25800030 ZZH RX IP 258 OP 636

## 2019-07-27 PROCEDURE — 25000128 H RX IP 250 OP 636: Performed by: PEDIATRICS

## 2019-07-27 RX ORDER — SODIUM CHLORIDE 9 MG/ML
INJECTION, SOLUTION INTRAVENOUS
Status: DISPENSED
Start: 2019-07-27 | End: 2019-07-28

## 2019-07-27 RX ADMIN — PANTOPRAZOLE SODIUM 40 MG: 40 TABLET, DELAYED RELEASE ORAL at 10:42

## 2019-07-27 RX ADMIN — CLOTRIMAZOLE: 10 SOLUTION TOPICAL at 10:39

## 2019-07-27 RX ADMIN — CLOTRIMAZOLE: 10 SOLUTION TOPICAL at 20:31

## 2019-07-27 RX ADMIN — VANCOMYCIN HYDROCHLORIDE 750 MG: 1 INJECTION, POWDER, LYOPHILIZED, FOR SOLUTION INTRAVENOUS at 20:24

## 2019-07-27 RX ADMIN — BUPRENORPHINE HCL 4 MG: 2 TABLET SUBLINGUAL at 20:24

## 2019-07-27 RX ADMIN — BUPRENORPHINE HCL 4 MG: 2 TABLET SUBLINGUAL at 11:56

## 2019-07-27 RX ADMIN — VANCOMYCIN HYDROCHLORIDE 750 MG: 1 INJECTION, POWDER, LYOPHILIZED, FOR SOLUTION INTRAVENOUS at 11:57

## 2019-07-27 RX ADMIN — VANCOMYCIN HYDROCHLORIDE 1000 MG: 1 INJECTION, SOLUTION INTRAVENOUS at 02:53

## 2019-07-27 RX ADMIN — ENOXAPARIN SODIUM 40 MG: 40 INJECTION SUBCUTANEOUS at 10:42

## 2019-07-27 RX ADMIN — PANTOPRAZOLE SODIUM 40 MG: 40 TABLET, DELAYED RELEASE ORAL at 16:11

## 2019-07-27 RX ADMIN — AMLODIPINE BESYLATE 2.5 MG: 2.5 TABLET ORAL at 10:41

## 2019-07-27 ASSESSMENT — ACTIVITIES OF DAILY LIVING (ADL)
ADLS_ACUITY_SCORE: 12
ADLS_ACUITY_SCORE: 11
ADLS_ACUITY_SCORE: 12
ADLS_ACUITY_SCORE: 11
ADLS_ACUITY_SCORE: 11
ADLS_ACUITY_SCORE: 10

## 2019-07-27 NOTE — PROGRESS NOTES
"Patient seen and examined     S- doing well. No new concerns. No fever      4 point ROS done and neg unless mentioned     O-   /74   Pulse 75   Temp 97.5  F (36.4  C) (Oral)   Resp 16   Ht 1.803 m (5' 11\")   Wt 74.3 kg (163 lb 12.8 oz)   SpO2 99%   BMI 22.85 kg/m     Gen- pleasant   HEENT- NAD, VERENICE  Neck- supple, no JVD elevation, no thyromegaly  CVS- I+II+ no m/r/g  RS- CTABS  Abdo- soft, no tenderness. No g/r/r   Ext- RLE no erythme around ankle. Dry skin. Swelling improved    LABS:   BMP  Recent Labs   Lab 07/26/19  0654 07/23/19  0618 07/22/19  0709 07/21/19  0750   NA  --   --  140 141   POTASSIUM  --   --  3.5 3.4   CHLORIDE  --   --  110* 109   PILAR  --   --  8.4* 9.2   CO2  --   --  24 26   BUN  --   --  4* 7   CR 0.56* 0.50* 0.56* 0.47*   GLC  --   --  99 103*     CBC  Recent Labs   Lab 07/26/19  0654 07/23/19  0618 07/21/19  0750   WBC  --   --  9.1   RBC  --   --  5.02   HGB  --   --  13.8   HCT  --   --  42.1   MCV  --   --  84   MCH  --   --  27.5   MCHC  --   --  32.8   RDW  --   --  13.2    478* 541*     LFTs  Recent Labs   Lab 07/21/19  0750   ALKPHOS 104   AST 23   ALT 32   BILITOTAL 0.6   PROTTOTAL 8.7   ALBUMIN 2.9*      A/P:  1)  RLE cellulitis dating back to 6/8 incompletely or inadequately treated.  Essentially resolved on IV vanco.  Neg follow up doppler for DVT.   - Continue IV vanco (plan for 4 weeks), will clarify from ID Monday exact dates.)  MRSA bacteremia documented 6/8.  Clinically not toxic or septic appearing.  On IV vanco.  Neg Echo.    3)  IV heroin dependence, continuous.  WD controlled on subutex.  Ct. Monitor COWS  -Pt is open to [facility] for treatment  4)  HTN, labile.  Ct amlodipine 2.5 mg and Lopressor 25 mg BID  5)  Depression, anxiety per record. Appears compensated.   6)  Altered MS with sedation, mild confusion on admission related to substance use.  Resolved.   7)  Peripheral neuropathy per record previously on neurontin.   8) Ct PPI.   9)  Hepatitis " C with active disease status. F/u PMD      Jignesh Kurtz MD  Internal Medicine/ Hospitalist

## 2019-07-27 NOTE — PROVIDER NOTIFICATION
Suspected IV tampering from patient. RN noticed when pt came back from outside that IV port closest to pt was backed up with blood and seemed like pt was trying to see if there was blood return. IV pump has been set to TKO but was occluded because pt put down IV pole. RN asked pt if he was messing with the IV at all and pt denied. NST was in room taking VS and all of them seem normal. Will recheck vitals and pt behavior in 20 min to see if there is a change. MD made aware.       1736  Pt left unit with visitors again without notifying RN or other staff.

## 2019-07-27 NOTE — PLAN OF CARE
Patient A & O x4. Pt up independently. Lung sounds clear throughout. Patient denies chest pain, SOB, lightheadedness, dizziness, tingling, numbness, nausea, and vomiting. Bowel sounds active, last BM 7/26, passing flatus, and tolerating regular diet. Drinking well and voiding spontaneously without difficulties. PIV TKO in R lower in between abx. Patient able to wiggle toes. CMS intact. Dressing clean, dry, and intact. Denies pain. Pt did go outside multiple times this shift and had visitors this evening. Vanco was running so pt took IV pole outside. Pt does foot soaks independently in room, pt stated RLE feels better. There is some blanchable redness on RLE near ankle, but pt stated it was not painful. Plan is to discharge to treatment center when bed is available. Patient demonstrates ability to use call light appropriately. Will continue to monitor patient.

## 2019-07-27 NOTE — PHARMACY-VANCOMYCIN DOSING SERVICE
Pharmacy Vancomycin Note  Date of Service 2019  Patient's  1975   43 year old, male    Indication: SSTI/MRSA Bacteremia; On 19 Pt was give PO bactrim x 10 days for SSTI at [facility]. Subsequent blood culture showed MRSA bacteremia. Medical staff unable to contact pt to refer to additional care. Pt presented to [facility]  for SSTI. Local blood cultures negative, ID has elected to treat for 4 weeks.  Goal Trough Level: 10-15 mg/L  Day of Therapy: -present  Current Vancomycin regimen:  1000 mg IV q8h    Current estimated CrCl = Estimated Creatinine Clearance: 182.4 mL/min (A) (based on SCr of 0.56 mg/dL (L)).    Creatinine for last 3 days  2019:  6:54 AM Creatinine 0.56 mg/dL    Recent Vancomycin Levels (past 3 days)  2019:  4:29 PM Vancomycin Level 17.6 mg/L  2019:  7:58 AM Vancomycin Level 15.8 mg/L  2019:  9:39 AM Vancomycin Level 17.1 mg/L    Vancomycin IV Administrations (past 72 hours)                   vancomycin (VANCOCIN) 1000 mg in dextrose 5% 200 mL PREMIX (mg) 1,000 mg New Bag 19 0253     1,000 mg New Bag 19 1848     1,000 mg New Bag  0920     1,000 mg New Bag  0121     1,000 mg New Bag 19 1615     1,000 mg New Bag  0910                Nephrotoxins and other renal medications (From now, onward)    Start     Dose/Rate Route Frequency Ordered Stop    19 1130  vancomycin (VANCOCIN) 750 mg in sodium chloride 0.9 % 250 mL intermittent infusion      750 mg  over 90 Minutes Intravenous EVERY 8 HOURS 19 1048               Contrast Orders - past 72 hours (72h ago, onward)    None          Interpretation of levels and current regimen:  Trough level is  Supratherapeutic    Has serum creatinine changed > 50% in last 72 hours: No    Urine output:  unable to determine    Renal Function: Stable    Plan:  1.  Decrease Dose to 750mg Q8H  2.  Pharmacy will check trough levels as appropriate in 1-3 Days.    3. Serum creatinine levels will be ordered a  minimum of twice weekly.      Reginald Pena, PharmD  PGY1 Pharmacy Resident          .

## 2019-07-27 NOTE — PLAN OF CARE
VS: VSS and afebrile.    O2: Room air. O2>90%.    Output: Voiding independently in bathroom.    Last BM: 7/26.   Activity: Up ad cristian independently.    Skin: Intact except for RLE cellulitis.   Pain: Denies.    CMS: Intact.    Dressing: Open to air.    Diet: Regular.    LDA: New PIV placed. Pt came back from being outside with IV half way out.    Equipment: Personal belongings.    Plan: Treatment center once bed is available.    Additional Info: Pt went outside once during beginning of shift.   COWS score of 1.

## 2019-07-27 NOTE — PROGRESS NOTES
Patient has been educated on potential risks of choosing to leave the unit and that the responsibility for patient well-being will belong to the patient. Pt has been informed that admission to hospital is due to need for medical treatment. Education given to the patient on some of the potential risks included but are not limited to:      - lack of access to nursing intervention      - possible missed appointments with MD, therapies, tests      - possible missed medications, antibiotics, management of IV's    Patient Response: *did not notify nurse*     Patient notified staff prior to leaving unit: no  Coban wrap placed over IV prior to pt leaving unit: IV protector in place.

## 2019-07-27 NOTE — PROGRESS NOTES
Patient has been educated on potential risks of choosing to leave the unit and that the responsibility for patient well-being will belong to the patient. Pt has been informed that admission to hospital is due to need for medical treatment. Education given to the patient on some of the potential risks included but are not limited to:      - lack of access to nursing intervention      - possible missed appointments with MD, therapies, tests      - possible missed medications, antibiotics, management of IV's    Patient Response:pt has been educated multiple times on risk sof leaving unit     Patient notified staff prior to leaving unit: no  Coban wrap placed over IV prior to pt leaving unit no pt did not notify staff

## 2019-07-28 LAB — VANCOMYCIN SERPL-MCNC: 10.3 MG/L

## 2019-07-28 PROCEDURE — 25000132 ZZH RX MED GY IP 250 OP 250 PS 637: Performed by: INTERNAL MEDICINE

## 2019-07-28 PROCEDURE — 25800030 ZZH RX IP 258 OP 636

## 2019-07-28 PROCEDURE — 99231 SBSQ HOSP IP/OBS SF/LOW 25: CPT | Performed by: INTERNAL MEDICINE

## 2019-07-28 PROCEDURE — 25000128 H RX IP 250 OP 636

## 2019-07-28 PROCEDURE — 99207 ZZC CDG-CUT & PASTE-POTENTIAL IMPACT ON LEVEL: CPT | Performed by: INTERNAL MEDICINE

## 2019-07-28 PROCEDURE — 25000128 H RX IP 250 OP 636: Performed by: PEDIATRICS

## 2019-07-28 PROCEDURE — 36415 COLL VENOUS BLD VENIPUNCTURE: CPT | Performed by: PEDIATRICS

## 2019-07-28 PROCEDURE — 12000001 ZZH R&B MED SURG/OB UMMC

## 2019-07-28 PROCEDURE — 80202 ASSAY OF VANCOMYCIN: CPT | Performed by: PEDIATRICS

## 2019-07-28 RX ORDER — SODIUM CHLORIDE 9 MG/ML
INJECTION, SOLUTION INTRAVENOUS
Status: COMPLETED
Start: 2019-07-28 | End: 2019-07-28

## 2019-07-28 RX ORDER — AMLODIPINE BESYLATE 2.5 MG/1
2.5 TABLET ORAL ONCE
Status: COMPLETED | OUTPATIENT
Start: 2019-07-28 | End: 2019-07-28

## 2019-07-28 RX ADMIN — PANTOPRAZOLE SODIUM 40 MG: 40 TABLET, DELAYED RELEASE ORAL at 10:54

## 2019-07-28 RX ADMIN — CLOTRIMAZOLE: 10 SOLUTION TOPICAL at 20:27

## 2019-07-28 RX ADMIN — BUPRENORPHINE HCL 4 MG: 2 TABLET SUBLINGUAL at 10:55

## 2019-07-28 RX ADMIN — METOPROLOL TARTRATE 25 MG: 25 TABLET, FILM COATED ORAL at 20:26

## 2019-07-28 RX ADMIN — CLOTRIMAZOLE: 10 SOLUTION TOPICAL at 10:54

## 2019-07-28 RX ADMIN — BUPRENORPHINE HCL 4 MG: 2 TABLET SUBLINGUAL at 20:25

## 2019-07-28 RX ADMIN — AMLODIPINE BESYLATE 2.5 MG: 2.5 TABLET ORAL at 10:57

## 2019-07-28 RX ADMIN — VANCOMYCIN HYDROCHLORIDE 750 MG: 1 INJECTION, POWDER, LYOPHILIZED, FOR SOLUTION INTRAVENOUS at 21:41

## 2019-07-28 RX ADMIN — ENOXAPARIN SODIUM 40 MG: 40 INJECTION SUBCUTANEOUS at 10:55

## 2019-07-28 RX ADMIN — AMLODIPINE BESYLATE 2.5 MG: 2.5 TABLET ORAL at 18:20

## 2019-07-28 RX ADMIN — VANCOMYCIN HYDROCHLORIDE 750 MG: 1 INJECTION, POWDER, LYOPHILIZED, FOR SOLUTION INTRAVENOUS at 13:22

## 2019-07-28 RX ADMIN — VANCOMYCIN HYDROCHLORIDE 750 MG: 1 INJECTION, POWDER, LYOPHILIZED, FOR SOLUTION INTRAVENOUS at 03:52

## 2019-07-28 RX ADMIN — SODIUM CHLORIDE 500 ML: 9 INJECTION, SOLUTION INTRAVENOUS at 16:05

## 2019-07-28 RX ADMIN — PANTOPRAZOLE SODIUM 40 MG: 40 TABLET, DELAYED RELEASE ORAL at 16:01

## 2019-07-28 RX ADMIN — METOPROLOL TARTRATE 25 MG: 25 TABLET, FILM COATED ORAL at 10:57

## 2019-07-28 ASSESSMENT — ACTIVITIES OF DAILY LIVING (ADL)
ADLS_ACUITY_SCORE: 10
ADLS_ACUITY_SCORE: 12

## 2019-07-28 NOTE — PLAN OF CARE
Patient A & O x4. Pt up independently in room and outside multiple times this shift and doesn't notify staff. Pt brings IV pole with outside and had visitors most of the night. VSS. Lung sounds clear throughout. Patient denies chest pain, SOB, lightheadedness, dizziness, tingling, numbness, nausea, and vomiting. Bowel sounds active, last BM 7/26, passing flatus, and tolerating regular diet. Drinking well and voiding spontaneously without difficulties. PIV TKO in between abx in L lower-replaced all tubing. Patient able to wiggle toes. CMS intact. Pt independently did foot soak x2 today. Pt denies pain.COWS 3.Plan is to discharge when chem dep tx placement found. Patient demonstrates ability to use call light appropriately. Will continue to monitor patient.

## 2019-07-28 NOTE — PHARMACY-VANCOMYCIN DOSING SERVICE
Pharmacy Vancomycin Note  Date of Service 2019  Patient's  1975   43 year old, male    Indication: SSTI/MRSA Bacteremia; On 19 Pt was give PO bactrim x 10 days for SSTI at [facility]. Subsequent blood culture showed MRSA bacteremia. Medical staff unable to contact pt to refer to additional care. Pt presented to [facility]  for SSTI. Local blood cultures negative, ID has elected to treat for 4 weeks.  Goal Trough Level: 10-15 mg/L  Day of Therapy: -present (ID plan for 4 weeks total)  Current Vancomycin regimen:  750 mg IV q8h    Current estimated CrCl = Estimated Creatinine Clearance: 182.4 mL/min (A) (based on SCr of 0.56 mg/dL (L)).    Creatinine for last 3 days  2019:  6:54 AM Creatinine 0.56 mg/dL    Recent Vancomycin Levels (past 3 days)  2019:  9:39 AM Vancomycin Level 17.1 mg/L  2019: 10:47 AM Vancomycin Level 10.3 mg/L    Vancomycin IV Administrations (past 72 hours)                   vancomycin (VANCOCIN) 750 mg in sodium chloride 0.9 % 250 mL intermittent infusion (mg) 750 mg New Bag 19 0352     750 mg New Bag 19 2024     750 mg New Bag  1157    vancomycin (VANCOCIN) 1000 mg in dextrose 5% 200 mL PREMIX (mg) 1,000 mg New Bag 19 0253     1,000 mg New Bag 19 1848     1,000 mg New Bag  0920     1,000 mg New Bag  0121     1,000 mg New Bag 19 1615                Nephrotoxins and other renal medications (From now, onward)    Start     Dose/Rate Route Frequency Ordered Stop    19 1130  vancomycin (VANCOCIN) 750 mg in sodium chloride 0.9 % 250 mL intermittent infusion      750 mg  over 90 Minutes Intravenous EVERY 8 HOURS 19 1048               Contrast Orders - past 72 hours (72h ago, onward)    None          Interpretation of levels and current regimen:  Trough level is  Therapeutic    Has serum creatinine changed > 50% in last 72 hours: No    Urine output:  unable to determine    Renal Function: Stable    Plan:  1.  Continue Current  Dose  2.  Pharmacy will check trough levels as appropriate in 3-5 Days.    3. Serum creatinine levels will be ordered a minimum of twice weekly.      Reginald Pena, PharmD  PGY1 Pharmacy Resident          .

## 2019-07-28 NOTE — PROGRESS NOTES
Patient has been educated on potential risks of choosing to leave the unit and that the responsibility for patient well-being will belong to the patient. Pt has been informed that admission to hospital is due to need for medical treatment. Education given to the patient on some of the potential risks included but are not limited to:      - lack of access to nursing intervention      - possible missed appointments with MD, therapies, tests      - possible missed medications, antibiotics, management of IV's    Patient Response:verbalized understanding still doesn't notify staff     Patient notified staff prior to leaving unit: no  Coban wrap placed over IV prior to pt leaving unit staff was not notified

## 2019-07-28 NOTE — PROGRESS NOTES
"Patient seen and examined     S- doing well. No new concerns. No fever      4 point ROS done and neg unless mentioned     O-   /67 (BP Location: Right arm)   Pulse 74   Temp 96.7  F (35.9  C) (Oral)   Resp 16   Ht 1.803 m (5' 11\")   Wt 74.3 kg (163 lb 12.8 oz)   SpO2 99%   BMI 22.85 kg/m     Gen- pleasant   HEENT- NAD, VERENICE  Neck- supple, no JVD elevation, no thyromegaly  CVS- I+II+ no m/r/g  RS- CTABS  Abdo- soft, no tenderness. No g/r/r   Ext- RLE no erythme around ankle. Dry skin. Swelling improved    LABS:   BMP  Recent Labs   Lab 07/26/19  0654 07/23/19  0618 07/22/19  0709   NA  --   --  140   POTASSIUM  --   --  3.5   CHLORIDE  --   --  110*   PILAR  --   --  8.4*   CO2  --   --  24   BUN  --   --  4*   CR 0.56* 0.50* 0.56*   GLC  --   --  99     CBC  Recent Labs   Lab 07/26/19  0654 07/23/19  0618    478*     A/P:  1)  RLE cellulitis dating back to 6/8 incompletely or inadequately treated.  Essentially resolved on IV vanco.  Neg follow up doppler for DVT.   - Continue IV vanco (plan for 4 weeks), will clarify from ID Monday exact dates.  MRSA bacteremia documented 6/8.  Clinically not toxic or septic appearing.  On IV vanco.  Neg Echo.    3)  IV heroin dependence, continuous.  WD controlled on subutex.  Ct. Monitor COWS  -Pt is open to [facility] for treatment  4)  HTN, labile.  Ct amlodipine 2.5 mg and Lopressor 25 mg BID  5)  Depression, anxiety per record. Appears compensated.   6)  Altered MS with sedation, mild confusion on admission related to substance use.  Resolved.   7)  Peripheral neuropathy per record previously on neurontin.   8) Ct PPI.   9)  Hepatitis C with active disease status. F/u PMD      Jignesh Kurtz MD  Internal Medicine/ Hospitalist    "

## 2019-07-28 NOTE — PLAN OF CARE
VS: Stable. COWS 1.   O2: RA.   Output: Voiding independently in bathroom.   Last BM: 7/27/19.   Activity: Independent, pt goes outside frequently when awake.   Skin: Right foot and ankle swollen, red, flaky skin.   Pain: Denies pain.   CMS: Intact.   Dressing: BETSY.   Diet: Regular.   LDA: PIV left upper arm infusing TKO.   Equipment: IV pole, pillows, call light within reach.   Plan: Continue to monitor. Plan is to discharge when chem dep tx placement found.   Additional Info: Do not throw away or leave any syringes in room, monitor behavior.

## 2019-07-29 LAB
CREAT SERPL-MCNC: 0.54 MG/DL (ref 0.66–1.25)
CRP SERPL-MCNC: 7.5 MG/L (ref 0–8)
ERYTHROCYTE [DISTWIDTH] IN BLOOD BY AUTOMATED COUNT: 13.5 % (ref 10–15)
GFR SERPL CREATININE-BSD FRML MDRD: >90 ML/MIN/{1.73_M2}
HCT VFR BLD AUTO: 40.1 % (ref 40–53)
HGB BLD-MCNC: 12.9 G/DL (ref 13.3–17.7)
MCH RBC QN AUTO: 27.3 PG (ref 26.5–33)
MCHC RBC AUTO-ENTMCNC: 32.2 G/DL (ref 31.5–36.5)
MCV RBC AUTO: 85 FL (ref 78–100)
PLATELET # BLD AUTO: 353 10E9/L (ref 150–450)
RBC # BLD AUTO: 4.72 10E12/L (ref 4.4–5.9)
WBC # BLD AUTO: 7 10E9/L (ref 4–11)

## 2019-07-29 PROCEDURE — 99232 SBSQ HOSP IP/OBS MODERATE 35: CPT | Performed by: HOSPITALIST

## 2019-07-29 PROCEDURE — 12000001 ZZH R&B MED SURG/OB UMMC

## 2019-07-29 PROCEDURE — 25000132 ZZH RX MED GY IP 250 OP 250 PS 637: Performed by: INTERNAL MEDICINE

## 2019-07-29 PROCEDURE — 82565 ASSAY OF CREATININE: CPT | Performed by: INTERNAL MEDICINE

## 2019-07-29 PROCEDURE — 25000128 H RX IP 250 OP 636

## 2019-07-29 PROCEDURE — 36416 COLLJ CAPILLARY BLOOD SPEC: CPT | Performed by: INTERNAL MEDICINE

## 2019-07-29 PROCEDURE — 25800030 ZZH RX IP 258 OP 636

## 2019-07-29 PROCEDURE — 99207 ZZC NO CHARGE VISIT/PATIENT NOT SEEN: CPT | Performed by: INTERNAL MEDICINE

## 2019-07-29 PROCEDURE — 86140 C-REACTIVE PROTEIN: CPT | Performed by: INTERNAL MEDICINE

## 2019-07-29 PROCEDURE — 85027 COMPLETE CBC AUTOMATED: CPT | Performed by: INTERNAL MEDICINE

## 2019-07-29 PROCEDURE — 25000128 H RX IP 250 OP 636: Performed by: PEDIATRICS

## 2019-07-29 RX ADMIN — PANTOPRAZOLE SODIUM 40 MG: 40 TABLET, DELAYED RELEASE ORAL at 07:51

## 2019-07-29 RX ADMIN — VANCOMYCIN HYDROCHLORIDE 750 MG: 1 INJECTION, POWDER, LYOPHILIZED, FOR SOLUTION INTRAVENOUS at 12:31

## 2019-07-29 RX ADMIN — VANCOMYCIN HYDROCHLORIDE 750 MG: 1 INJECTION, POWDER, LYOPHILIZED, FOR SOLUTION INTRAVENOUS at 21:11

## 2019-07-29 RX ADMIN — METOPROLOL TARTRATE 25 MG: 25 TABLET, FILM COATED ORAL at 20:29

## 2019-07-29 RX ADMIN — PANTOPRAZOLE SODIUM 40 MG: 40 TABLET, DELAYED RELEASE ORAL at 17:55

## 2019-07-29 RX ADMIN — METOPROLOL TARTRATE 25 MG: 25 TABLET, FILM COATED ORAL at 07:51

## 2019-07-29 RX ADMIN — CLOTRIMAZOLE: 10 SOLUTION TOPICAL at 07:51

## 2019-07-29 RX ADMIN — BUPRENORPHINE HCL 4 MG: 2 TABLET SUBLINGUAL at 20:29

## 2019-07-29 RX ADMIN — VANCOMYCIN HYDROCHLORIDE 750 MG: 1 INJECTION, POWDER, LYOPHILIZED, FOR SOLUTION INTRAVENOUS at 05:14

## 2019-07-29 RX ADMIN — AMLODIPINE BESYLATE 2.5 MG: 2.5 TABLET ORAL at 07:51

## 2019-07-29 RX ADMIN — ENOXAPARIN SODIUM 40 MG: 40 INJECTION SUBCUTANEOUS at 07:51

## 2019-07-29 RX ADMIN — BUPRENORPHINE HCL 4 MG: 2 TABLET SUBLINGUAL at 08:01

## 2019-07-29 RX ADMIN — CLOTRIMAZOLE: 10 SOLUTION TOPICAL at 20:30

## 2019-07-29 ASSESSMENT — ACTIVITIES OF DAILY LIVING (ADL)
ADLS_ACUITY_SCORE: 10
ADLS_ACUITY_SCORE: 9
ADLS_ACUITY_SCORE: 10
ADLS_ACUITY_SCORE: 10

## 2019-07-29 NOTE — PROGRESS NOTES
"INFECTIOUS DISEASES - ANTIBIOTIC MANAGEMENT (NO CHARGE)    With respect to the four weeks of IV vancomycin, \"day 1\" was 7/20/2019.    As a result, the final date for the IV vancomycin will be August 16, 2019.    Electronically signed by Zi Putnam M.D.  7/29/2019 9:38 AM  --   "

## 2019-07-29 NOTE — PLAN OF CARE
VS: VSS w/exception of high BP; see provider notification note  Alert and oriented x4 with COWs score 1   O2: >95% on RA   Output: Voiding spontaneously in toilet    Last BM: 7/27/19    Activity: Up independently   Leaving unit to smoke x3 this shift    Skin: Intact; R foot/ankle pink and +1-2 edema noted. Skin dry and flaky at site; medication applied per order   Pain: Denies    CMS: Intact; denies n/t  Minimal RLE weakness noted    Dressing: Cellulitis BETSY   Diet: Regular; tolerating well    LDA: L PIV infusing TKO between IV vanco   Equipment: IV pole    Plan: Continue IV abx    Additional Info: Able to make needs known; flat affect noted. Calm and cooperative with nursing cares.       Patient has been educated on potential risks of choosing to leave the unit and that the responsibility for patient well-being will belong to the patient. Pt has been informed that admission to hospital is due to need for medical treatment. Education given to the patient on some of the potential risks included but are not limited to:      - lack of access to nursing intervention      - possible missed appointments with MD, therapies, tests      - possible missed medications, antibiotics, management of IV's    Patient Response: acceptance     Patient notified staff prior to leaving unit: no  Coban wrap placed over IV prior to pt leaving unit no; IV infusing

## 2019-07-29 NOTE — PROGRESS NOTES
SPIRITUAL HEALTH SERVICES  SPIRITUAL ASSESSMENT Progress Note  [facility] ([facility]) 8A     REFERRAL SOURCE: length of stay    Pt Mert declined a  visit at this time.    PLAN: [facility] remains available for the duration of hospitalization.    William Chauhan   Intern  Pager [phone]

## 2019-07-29 NOTE — PROGRESS NOTES
[facility], [place]    Hospitalist Progress Note    Date of Service (when I saw the patient): 07/29/2019    Assessment & Plan     1)  RLE cellulitis dating back to 6/8 incompletely or inadequately treated.  Essentially resolved on IV vanco.  Neg follow up doppler for DVT.     - MRSA bacteremia documented 6/8.  Clinically not toxic or septic appearing. Neg Echo.   - Continue IV vanco (plan for 4 weeks), last day on August 16th Per ID (Dr Putnam).  - needs weekly CBC, CMP and CRP while on Vanc. Last one today, so do next monday       3)  IV heroin dependence, continuous.  WD controlled on subutex.  Ct. Monitor COWS  -Pt is open to [facility] for treatment  4)  HTN, labile.  Ct amlodipine 2.5 mg and metoprolol 25 mg BID  5)  Depression, anxiety per record. Appears compensated.   6)  Altered MS with sedation, mild confusion on admission related to substance use.  Resolved.   7)  Peripheral neuropathy per record, was on neurontin. Off it right now. Doing okay. .   8)  Hepatitis C with active disease. 5.1 Million copies of hep C on 07/23/19. F/u PMD     DVT Prophylaxis: Enoxaparin (Lovenox) SQ  Code Status: Full Code    Disposition: [facility].    Bell Meade MD    Interval History   Doing well. Tolerating Ab fine.     -Data reviewed today: I reviewed all new labs and imaging results over the last 24 hours. I personally reviewed no images or EKG's today.    Physical Exam   Temp: 96.3  F (35.7  C) Temp src: Axillary BP: 126/78 Pulse: 71   Resp: 16 SpO2: 91 % O2 Device: None (Room air)    Vitals:    07/20/19 0349 07/23/19 0630   Weight: 74.1 kg (163 lb 6.4 oz) 74.3 kg (163 lb 12.8 oz)     Vital Signs with Ranges  Temp:  [96.3  F (35.7  C)-97.2  F (36.2  C)] 96.3  F (35.7  C)  Pulse:  [71-78] 71  Resp:  [15-16] 16  BP: (126-165)/(78-94) 126/78  SpO2:  [91 %-100 %] 91 %  I/O last 3 completed shifts:  In: 980 [P.O.:980]  Out: -     Constitutional: Awake, alert, cooperative, no  apparent distress  Respiratory: Clear to auscultation bilaterally, no crackles or wheezing  Cardiovascular: Regular rate and rhythm, normal S1 and S2, and no murmur noted  GI: Normal bowel sounds, soft, non-distended, non-tender  Skin/Integumen: Right ankle is mildly swollen and its slightly warmer than the left. Left one dose not have edema. There is some skin discoloration on both sides. Some flaky exfoliating skin noted on right ankle and heel as well.      Medications       amLODIPine  2.5 mg Oral Daily     buprenorphine  4 mg Sublingual BID     clotrimazole   Topical BID     enoxaparin  40 mg Subcutaneous Q24H     metoprolol tartrate  25 mg Oral BID     pantoprazole  40 mg Oral BID AC     vancomycin (VANCOCIN) IV  750 mg Intravenous Q8H       Data   Recent Labs   Lab 07/29/19  0733 07/26/19  0654 07/23/19  0618   WBC 7.0  --   --    HGB 12.9*  --   --    MCV 85  --   --     446 478*   CR 0.54* 0.56* 0.50*       No results found for this or any previous visit (from the past 24 hour(s)).

## 2019-07-29 NOTE — PROGRESS NOTES
VS: Stable   O2: Room air saturations 98%.    Output: Up to bathroom independently   Last BM: 7/28/2019 reports patient   Activity: Up ad cristian   Skin: Right lower ankle is less red and seems to be improving. However patient continues to have quite flaky skin on both of the feet on bottom. Medication was applied per Drs recommendations.    Pain: Denies   CMS: Intact   Dressing: NA   Diet: Regular   LDA: NA   Equipment: IV pole.    Plan:    Additional Info: Pt has a lot of visitor at bedside through out the shift Pt was very pleasant with conversation this am. However does not make eye contact with staff. Continues to play games on cell phone. Patient was awake and not sleepy this shift. Ate well for breakfast and went off the unit to go and smoke often. Status of patient will continue to be monitored.

## 2019-07-29 NOTE — PLAN OF CARE
VS: Stable. COWS 0.   O2: RA.   Output: Voiding independently in bathroom, unable to measure output.   Last BM: 7/28/19.   Activity: Independent with IV pole, goes outside frequently.   Skin: Right foot/ankle pink, edematous, flaky skin.   Pain: Denies pain.   CMS: Intact.   Dressing: BETSY.   Diet: Regular.   LDA: PIV left upper arm infusing TKO.   Equipment: IV pole, pillows, call light within reach.   Plan: Continue to monitor.   Additional Info: Pt had multiple visitors throughout shift, pt awake through night, going outside frequently, IV tubing appeared to have been tampered with as drip chambers were filled with fluid and primary bag had been moved up.

## 2019-07-30 LAB — VANCOMYCIN SERPL-MCNC: 8.7 MG/L

## 2019-07-30 PROCEDURE — 80202 ASSAY OF VANCOMYCIN: CPT | Performed by: PEDIATRICS

## 2019-07-30 PROCEDURE — 12000001 ZZH R&B MED SURG/OB UMMC

## 2019-07-30 PROCEDURE — 25000128 H RX IP 250 OP 636: Performed by: PEDIATRICS

## 2019-07-30 PROCEDURE — 36416 COLLJ CAPILLARY BLOOD SPEC: CPT | Performed by: PEDIATRICS

## 2019-07-30 PROCEDURE — 25000132 ZZH RX MED GY IP 250 OP 250 PS 637: Performed by: INTERNAL MEDICINE

## 2019-07-30 PROCEDURE — 25800030 ZZH RX IP 258 OP 636

## 2019-07-30 PROCEDURE — 99232 SBSQ HOSP IP/OBS MODERATE 35: CPT | Performed by: INTERNAL MEDICINE

## 2019-07-30 PROCEDURE — 25000128 H RX IP 250 OP 636

## 2019-07-30 RX ORDER — SODIUM CHLORIDE 9 MG/ML
INJECTION, SOLUTION INTRAVENOUS
Status: COMPLETED
Start: 2019-07-30 | End: 2019-07-30

## 2019-07-30 RX ADMIN — CLOTRIMAZOLE: 10 SOLUTION TOPICAL at 21:40

## 2019-07-30 RX ADMIN — PANTOPRAZOLE SODIUM 40 MG: 40 TABLET, DELAYED RELEASE ORAL at 17:50

## 2019-07-30 RX ADMIN — BUPRENORPHINE HCL 4 MG: 2 TABLET SUBLINGUAL at 09:14

## 2019-07-30 RX ADMIN — AMLODIPINE BESYLATE 2.5 MG: 2.5 TABLET ORAL at 16:06

## 2019-07-30 RX ADMIN — VANCOMYCIN HYDROCHLORIDE 750 MG: 1 INJECTION, POWDER, LYOPHILIZED, FOR SOLUTION INTRAVENOUS at 13:03

## 2019-07-30 RX ADMIN — VANCOMYCIN HYDROCHLORIDE 900 MG: 10 INJECTION, POWDER, LYOPHILIZED, FOR SOLUTION INTRAVENOUS at 21:41

## 2019-07-30 RX ADMIN — METOPROLOL TARTRATE 25 MG: 25 TABLET, FILM COATED ORAL at 16:07

## 2019-07-30 RX ADMIN — SODIUM CHLORIDE 500 ML: 9 INJECTION, SOLUTION INTRAVENOUS at 21:41

## 2019-07-30 RX ADMIN — BUPRENORPHINE HCL 4 MG: 2 TABLET SUBLINGUAL at 21:41

## 2019-07-30 RX ADMIN — ENOXAPARIN SODIUM 40 MG: 40 INJECTION SUBCUTANEOUS at 09:15

## 2019-07-30 RX ADMIN — CLOTRIMAZOLE: 10 SOLUTION TOPICAL at 09:15

## 2019-07-30 RX ADMIN — VANCOMYCIN HYDROCHLORIDE 750 MG: 1 INJECTION, POWDER, LYOPHILIZED, FOR SOLUTION INTRAVENOUS at 04:44

## 2019-07-30 RX ADMIN — PANTOPRAZOLE SODIUM 40 MG: 40 TABLET, DELAYED RELEASE ORAL at 09:16

## 2019-07-30 ASSESSMENT — ACTIVITIES OF DAILY LIVING (ADL)
ADLS_ACUITY_SCORE: 9

## 2019-07-30 NOTE — PHARMACY-VANCOMYCIN DOSING SERVICE
Pharmacy Vancomycin Note  Date of Service 2019  Patient's  1975   43 year old, male    Indication: Skin and Soft Tissue Infection/MRSA Bacteremia  Goal Trough Level: 10-15 mg/L  Day of Therapy: -present (ID plan for 4 weeks total)  Current Vancomycin regimen:  750 mg IV q8h    Current estimated CrCl = Estimated Creatinine Clearance: 189.2 mL/min (A) (based on SCr of 0.54 mg/dL (L)).    Creatinine for last 3 days  2019:  7:33 AM Creatinine 0.54 mg/dL    Recent Vancomycin Levels (past 3 days)  2019: 10:47 AM Vancomycin Level 10.3 mg/L  2019:  1:06 PM Vancomycin Level 8.7 mg/L (7.3 hour trough)     Vancomycin IV Administrations (past 72 hours)                   vancomycin (VANCOCIN) 750 mg in sodium chloride 0.9 % 250 mL intermittent infusion (mg) 750 mg New Bag 19 1303     750 mg New Bag  0444     750 mg New Bag 19 2111     750 mg New Bag  1231     750 mg New Bag  0514     750 mg New Bag 19 2141     750 mg New Bag  1322     750 mg New Bag  0352     750 mg New Bag 194                Nephrotoxins and other renal medications (From now, onward)    Start     Dose/Rate Route Frequency Ordered Stop    19 1130  vancomycin (VANCOCIN) 750 mg in sodium chloride 0.9 % 250 mL intermittent infusion      750 mg  over 90 Minutes Intravenous EVERY 8 HOURS 19 1048               Contrast Orders - past 72 hours (72h ago, onward)    None          Interpretation of levels and current regimen:  Trough level is  Subtherapeutic    Has serum creatinine changed > 50% in last 72 hours: No    Urine output:  unable to determine    Renal Function: Stable    Plan:  1.  Increase Dose to 900 mg q8h  2.  Pharmacy will check trough levels as appropriate in 1-3 Days.    3. Serum creatinine levels will be ordered a minimum of twice weekly.      Libertad Piedra        .

## 2019-07-30 NOTE — PLAN OF CARE
VS: His blood pressure this morning was 111/63 heart rate was 59, held his lopressor and metroprolol this morning, he was not available to get his blood pressure checked at noon, BP at 1600 was elevated, and held pills were given, recheck, was slightly better, will continue to monitor his BP   O2: Room air, lungs are clear   Output: He is up independent, voiding without any complaints   Last BM: 7/30/2019   Activity: Up ad cristian, he goes outside with his IV infusing   Skin: He has RLE cellulitis, it is getting better, still has some swelling and tenderness   Pain: Denies the need for anything for pain   CMS: intact   Dressing: Open to air   Diet: Regular, tolerating it well   LDA: PIV   Equipment: PIV   Plan: Anti-biotics until August 16th   Additional Info:

## 2019-07-30 NOTE — PLAN OF CARE
[]Hide copied text    []Lillie for details  Patient has been educated on potential risks of choosing to leave the unit and that the responsibility for patient well-being will belong to the patient. Pt has been informed that admission to hospital is due to need for medical treatment. Education given to the patient on some of the potential risks included but are not limited to:      - lack of access to nursing intervention      - possible missed appointments with MD, therapies, tests      - possible missed medications, antibiotics, management of IV's     Patient Response:pt has been educated multiple times on risk sof leaving unit      Patient notified staff prior to leaving unit: no  Coban wrap placed over IV prior to pt leaving unit no pt did not notify staff

## 2019-07-30 NOTE — PLAN OF CARE
VS: VSS   O2: Room air   Output: Voiding without difficulty in bathroom   Last BM: 7/29/19   Activity: Up independently   Skin: Pt has dry, flaky skin in BLE   Pain: Denies   CMS: Intact   Dressing: N/A   Diet: Regular   LDA: PIV infusing TKO between abx   Plan: TBD   Additional Info: Pt had visitor come around 0300, upon waking pt for IV abx pt appeared tired and fell asleep with head in air while writer in room. Pt was responsive to questions and cooperative. Continue to monitor.

## 2019-07-30 NOTE — PROGRESS NOTES
[facility]    Medicine Progress Note - Hospitalist Service       Date of Admission:  7/20/2019  Assessment & Plan       Mert Lyle is a 43 year old male admitted on 7/20/2019. Patient is IVDU with tendency to inject to the fight mid-leg.  He has cellulitis to the right leg extending into the right ankle.     1)  RLE cellulitis dating back to 6/8 incompletely or inadequately treated.    Essentially resolved on IV vanco.  Neg follow up doppler for DVT.     2)MRSA bacteremia documented 6/8.      Clinically not toxic or septic appearing. Neg Echo.   - Continue IV vanco (plan for 4 weeks), last day on August 16th Per ID (Dr Putnam).  - needs weekly CBC, CMP and CRP while on Vanc.( stable labs)     3)  IV heroin dependence, continuous.    WD controlled on subutex.  Ct. Monitor COWS  Pt is open to [facility] for treatment. SW working on this       4)  HTN, labile.  Ct amlodipine 2.5 mg and metoprolol 25 mg BID  5)  Depression, anxiety per record. Appears compensated.   6) Toxic encephalopathy with sedation, mild confusion on admission related to substance use.  Resolved.   7)  Peripheral neuropathy per record, was on neurontin. Off it right now.  .   8)  Hepatitis C with active disease. 5.1 Million copies of hep C on 07/23/19. F/u PMD        Diet: Combination Diet Regular Diet Adult    DVT Prophylaxis: Enoxaparin (Lovenox) SQ  Hanks Catheter: not present  Code Status: Full Code      Disposition Plan   Expected discharge: > 7 days, recommended to Drug treatment rehab unit  once SIRS/Sepsis treated.  Entered: Collin Hart MD 07/30/2019, 11:42 AM       The patient's care was discussed with the Bedside Nurse, Care Coordinator/ and Patient.    Collin Hart MD  Hospitalist Service  [facility], [place]    ______________________________________________________________________    Interval History   Mert feels  well today. No new complaints  ]Eating and drinking well   thinks that his leg is getting better   Discussed the importance of staying away from IVDU   Also discussed random blood and urine tests for drug toxicology     Data reviewed today: I reviewed all medications, new labs and imaging results over the last 24 hours. I personally reviewed no images or EKG's today.    Physical Exam   Vital Signs: Temp: 96.8  F (36  C) Temp src: Oral BP: 111/63 Pulse: 59   Resp: 16 SpO2: 100 % O2 Device: None (Room air)    Weight: 163 lbs 12.83 oz  General Appearance: Awake, alert and not in distress   Respiratory: Clear breath sounds bilaterally   Cardiovascular: Normal heart sounds   GI: Soft, non tender. Normal bowel sounds   Skin: josefa bruising / bleeding   Other: Rt lower extremity with some erythema,and swelling around the ankle area     Data   Recent Labs   Lab 07/29/19  0733 07/26/19  0654   WBC 7.0  --    HGB 12.9*  --    MCV 85  --     446   CR 0.54* 0.56*

## 2019-07-30 NOTE — PROGRESS NOTES
"CLINICAL NUTRITION SERVICES - ASSESSMENT NOTE     Nutrition Prescription    RECOMMENDATIONS FOR MDs/PROVIDERS TO ORDER:  None today    Malnutrition Status:    Unable to determine    Recommendations already ordered by Registered Dietitian (RD):  Weekly weight checks    Future/Additional Recommendations:  If PO intakes decline, consider supplements     REASON FOR ASSESSMENT  Mert Lyle is a/an 43 year old male assessed by the dietitian for LOS    NUTRITION HISTORY  - Pt not in room during attempted visit. Per chart review, pt goes outside throughout day.     CURRENT NUTRITION ORDERS  Diet: Regular  Intake/Tolerance: 100% of meals per flowsheet.     LABS  Labs reviewed    MEDICATIONS  Medications reviewed    ANTHROPOMETRICS  Height: 180.3 cm (5' 11\")  Most Recent Weight: 74.3 kg (163 lb 12.8 oz)    IBW: 78.2 kg  BMI: Normal BMI  Weight History: minimal wt hx.   Wt Readings from Last 5 Encounters:   07/23/19 74.3 kg (163 lb 12.8 oz)     Dosing Weight: 74 kg - most recent wt    ASSESSED NUTRITION NEEDS  Estimated Energy Needs: 4295-1154 kcals/day (25 - 30 kcals/kg)  Justification: Maintenance  Estimated Protein Needs: 59-74 grams protein/day (0.8 - 1 grams of pro/kg)  Justification: Maintenance  Estimated Fluid Needs: 1 mL/kcal  Justification: Per provider pending fluid status    PHYSICAL FINDINGS  See malnutrition section below.    MALNUTRITION  % Intake: No decreased intake noted  % Weight Loss: Unable to assess  Subcutaneous Fat Loss: Unable to assess  Muscle Loss: Unable to assess  Fluid Accumulation/Edema: None noted  Malnutrition Diagnosis: Unable to determine     NUTRITION DIAGNOSIS  Predicted inadequate protein-energy intake related to variable appetite as evidenced by pt reliant on PO intakes to meet 100% of nutritional needs with potential for variation         INTERVENTIONS  Implementation  Nutrition Education: Unable to complete     Goals  Patient to consume % of nutritionally adequate meal " trays TID, or the equivalent with supplements/snacks.     Monitoring/Evaluation  Progress toward goals will be monitored and evaluated per protocol.      Emerita Martinez RD, LD  Unit Pager: [phone]

## 2019-07-30 NOTE — PROGRESS NOTES
Pt complained of pain in IV in rt forearm around 1600 while vancomycin was running. Flushed reluctantly with pain. No edema, no redness. IV removed, cold compress given, and extravasation report done. Dr. Meade notified. New IV inserted and running well.

## 2019-07-30 NOTE — PLAN OF CARE
"  VS: BP (!) 137/93   Pulse 73   Temp 96.3  F (35.7  C) (Axillary)   Resp 16   Ht 1.803 m (5' 11\")   Wt 74.3 kg (163 lb 12.8 oz)   SpO2 91%   BMI 22.85 kg/m       O2: Oxygen   Output: Up to bathroom   Last BM: 7/28/2019   Activity: independent   Up for meals? Yes   Skin: Dry, flaky skin; edema and redness in rt foot continues to improve   Pain: denies   CMS: intact   Dressing: N/a   Diet: regular   LDA: PIV, TKO   Equipment: IV pole   Plan: On IV antibiotics until August 16th, seeking long-term placement   Additional Info: Pt reports that his heroin withdrawal is over and his COWS score is 0. Leaves the unit frequently to smoke and received several visitors today. Seems to be in contact with family again. Portion of a needle found on pt floor today but RN stacy recognized needle as equipment she used to insert new PIV. Room search called off but will continue to monitor for suspicious behavior.        "

## 2019-07-31 PROCEDURE — 25000132 ZZH RX MED GY IP 250 OP 250 PS 637: Performed by: INTERNAL MEDICINE

## 2019-07-31 PROCEDURE — 25800030 ZZH RX IP 258 OP 636

## 2019-07-31 PROCEDURE — 25000128 H RX IP 250 OP 636: Performed by: PEDIATRICS

## 2019-07-31 PROCEDURE — 12000001 ZZH R&B MED SURG/OB UMMC

## 2019-07-31 PROCEDURE — 25000128 H RX IP 250 OP 636

## 2019-07-31 PROCEDURE — 99231 SBSQ HOSP IP/OBS SF/LOW 25: CPT | Performed by: INTERNAL MEDICINE

## 2019-07-31 RX ADMIN — VANCOMYCIN HYDROCHLORIDE 900 MG: 10 INJECTION, POWDER, LYOPHILIZED, FOR SOLUTION INTRAVENOUS at 04:55

## 2019-07-31 RX ADMIN — CLOTRIMAZOLE: 10 SOLUTION TOPICAL at 20:22

## 2019-07-31 RX ADMIN — VANCOMYCIN HYDROCHLORIDE 900 MG: 10 INJECTION, POWDER, LYOPHILIZED, FOR SOLUTION INTRAVENOUS at 20:22

## 2019-07-31 RX ADMIN — CLOTRIMAZOLE: 10 SOLUTION TOPICAL at 08:08

## 2019-07-31 RX ADMIN — BUPRENORPHINE HCL 4 MG: 2 TABLET SUBLINGUAL at 08:14

## 2019-07-31 RX ADMIN — AMLODIPINE BESYLATE 2.5 MG: 2.5 TABLET ORAL at 17:11

## 2019-07-31 RX ADMIN — PANTOPRAZOLE SODIUM 40 MG: 40 TABLET, DELAYED RELEASE ORAL at 08:14

## 2019-07-31 RX ADMIN — PANTOPRAZOLE SODIUM 40 MG: 40 TABLET, DELAYED RELEASE ORAL at 16:49

## 2019-07-31 RX ADMIN — VANCOMYCIN HYDROCHLORIDE 900 MG: 10 INJECTION, POWDER, LYOPHILIZED, FOR SOLUTION INTRAVENOUS at 13:21

## 2019-07-31 RX ADMIN — ENOXAPARIN SODIUM 40 MG: 40 INJECTION SUBCUTANEOUS at 08:14

## 2019-07-31 RX ADMIN — BUPRENORPHINE HCL 4 MG: 2 TABLET SUBLINGUAL at 20:22

## 2019-07-31 RX ADMIN — METOPROLOL TARTRATE 25 MG: 25 TABLET, FILM COATED ORAL at 17:12

## 2019-07-31 ASSESSMENT — ACTIVITIES OF DAILY LIVING (ADL)
ADLS_ACUITY_SCORE: 9

## 2019-07-31 NOTE — PLAN OF CARE
Patient A/Ox4. VSS ex BP running slightly high. Denies CP, SOB, dizziness/LH. LSCTA. +fl/BS. Voiding well in bathroom. CMS intact. Tolerating regular diet without NV. Activity level is good, pt had a visitor for a short while tonight and went out once on evenings. IV running TKO between vancomycin doses. Denied pain, no PRNs requested. Has been pleasant and appropriate this shift. Patient has demonstrated ability to call appropriately. Patient is resting with call light within reach. Will continue to monitor.

## 2019-07-31 NOTE — PLAN OF CARE
VS: Blood pressure  Was  low this morning, held his meds, did give them to him this afternoon   O2: Room air, lungs are clear   Output: He has been instructed to give us a urine specimen, cup given has not voided for the past five hours, according to the patient   Last BM: 7/30/2019   Activity: Up ad cristian, has not gone out very often today   Skin: RLE cellulitis is resolving, some swelling at site   Pain: denies   CMS: intact   Dressing: none   Diet: Regular, tolerating it well   LDA: PIV intact, running TKO, intermittent Vanco   Equipment: IV    Plan: Anti-biotics till August 16th   Additional Info:

## 2019-08-01 LAB
CREAT SERPL-MCNC: 0.54 MG/DL (ref 0.66–1.25)
GFR SERPL CREATININE-BSD FRML MDRD: >90 ML/MIN/{1.73_M2}
PLATELET # BLD AUTO: 321 10E9/L (ref 150–450)
VANCOMYCIN SERPL-MCNC: 9.1 MG/L

## 2019-08-01 PROCEDURE — 85049 AUTOMATED PLATELET COUNT: CPT | Performed by: PEDIATRICS

## 2019-08-01 PROCEDURE — 25800030 ZZH RX IP 258 OP 636

## 2019-08-01 PROCEDURE — 25000128 H RX IP 250 OP 636: Performed by: PEDIATRICS

## 2019-08-01 PROCEDURE — 25000132 ZZH RX MED GY IP 250 OP 250 PS 637: Performed by: INTERNAL MEDICINE

## 2019-08-01 PROCEDURE — 82565 ASSAY OF CREATININE: CPT | Performed by: PEDIATRICS

## 2019-08-01 PROCEDURE — 36415 COLL VENOUS BLD VENIPUNCTURE: CPT | Performed by: PEDIATRICS

## 2019-08-01 PROCEDURE — 25000128 H RX IP 250 OP 636

## 2019-08-01 PROCEDURE — 12000001 ZZH R&B MED SURG/OB UMMC

## 2019-08-01 PROCEDURE — 99231 SBSQ HOSP IP/OBS SF/LOW 25: CPT | Performed by: INTERNAL MEDICINE

## 2019-08-01 PROCEDURE — 80202 ASSAY OF VANCOMYCIN: CPT | Performed by: PEDIATRICS

## 2019-08-01 RX ORDER — VANCOMYCIN HYDROCHLORIDE 1 G/200ML
1000 INJECTION, SOLUTION INTRAVENOUS EVERY 8 HOURS
Status: DISCONTINUED | OUTPATIENT
Start: 2019-08-02 | End: 2019-08-02

## 2019-08-01 RX ADMIN — VANCOMYCIN HYDROCHLORIDE 900 MG: 10 INJECTION, POWDER, LYOPHILIZED, FOR SOLUTION INTRAVENOUS at 20:12

## 2019-08-01 RX ADMIN — BUPRENORPHINE HCL 4 MG: 2 TABLET SUBLINGUAL at 09:00

## 2019-08-01 RX ADMIN — AMLODIPINE BESYLATE 2.5 MG: 2.5 TABLET ORAL at 08:59

## 2019-08-01 RX ADMIN — CLOTRIMAZOLE: 10 SOLUTION TOPICAL at 09:02

## 2019-08-01 RX ADMIN — ENOXAPARIN SODIUM 40 MG: 40 INJECTION SUBCUTANEOUS at 09:00

## 2019-08-01 RX ADMIN — PANTOPRAZOLE SODIUM 40 MG: 40 TABLET, DELAYED RELEASE ORAL at 08:59

## 2019-08-01 RX ADMIN — PANTOPRAZOLE SODIUM 40 MG: 40 TABLET, DELAYED RELEASE ORAL at 16:01

## 2019-08-01 RX ADMIN — BUPRENORPHINE HCL 4 MG: 2 TABLET SUBLINGUAL at 20:09

## 2019-08-01 RX ADMIN — VANCOMYCIN HYDROCHLORIDE 900 MG: 10 INJECTION, POWDER, LYOPHILIZED, FOR SOLUTION INTRAVENOUS at 04:37

## 2019-08-01 ASSESSMENT — ACTIVITIES OF DAILY LIVING (ADL)
ADLS_ACUITY_SCORE: 9

## 2019-08-01 NOTE — PROGRESS NOTES
[facility], [facility] Progress Note - Hospitalist Service       Date of Admission:  7/20/2019  Assessment & Plan       Mert Lyle is a 43 year old male admitted on 7/20/2019. Patient is IVDU with tendency to inject to the fight mid-leg.  He has cellulitis to the right leg extending into the right ankle.     1)  RLE cellulitis dating back to 6/8 incompletely or inadequately treated.    Essentially resolved on IV vanco.    Neg follow up doppler for DVT.   Since patient is actively ambulating multiple times in a day, will stop DVT prophylaxis     2)MRSA bacteremia documented 6/8.      Clinically not toxic or septic appearing. Neg Echo.   Continue IV vanco (plan for 4 weeks), last day on August 16th Per ID (Dr Putnam).  NBeeds weekly CBC, CMP and CRP while on Vanc.( stable labs)     3)  IV heroin dependence, continuous.    WD controlled on subutex.  Ct. Monitor COWS  Pt is open to [facility] for treatment. SW working on this   Random urine toxicology studies while on floor. I have discussed this with patient, but has so far not given urine.   Will bursue with blood toxicology on 8/2    4)  HTN, labile.    Ct amlodipine 2.5 mg and metoprolol 25 mg BID    5)  Depression, anxiety per record.   Appears compensated.     7)  Peripheral neuropathy per record, was on neurontin. Off it right now.  .   8)  Hepatitis C with active disease. 5.1 Million copies of hep C on 07/23/19. F/u PMD        Diet: Combination Diet Regular Diet Adult    DVT Prophylaxis: Ambulate. Low risk   Hanks Catheter: not present  Code Status: Full Code      Disposition Plan   Expected discharge: > 7 days, recommended to Drug treatment rehab unit  once SIRS/Sepsis treated.  Entered: Collin Hart MD 08/01/2019, 11:53 AM       The patient's care was discussed with the Bedside Nurse, Care Coordinator/ and Patient.    Collin Hart MD  Hospitalist Service  [facility]  [facility], [place]    ______________________________________________________________________    Interval History   Mert continues to feel well  Denies tampering with his IV line  No new complaints'   advised to provide a sample of urine     Data reviewed today: I reviewed all medications, new labs and imaging results over the last 24 hours. I personally reviewed no images or EKG's today.    Physical Exam   Vital Signs: Temp: 96.5  F (35.8  C) Temp src: Oral BP: 122/80 Pulse: 73   Resp: 18 SpO2: 96 % O2 Device: None (Room air)    Weight: 163 lbs 12.83 oz  General Appearance: Awake, alert and not in distress   Respiratory: Clear breath sounds bilaterally   Cardiovascular: Normal heart sounds   GI: Soft, non tender. Normal bowel sounds   Skin: josefa bruising / bleeding   Other: Rt lower extremity with some erythema,and swelling around the ankle area     Data   Recent Labs   Lab 08/01/19  0811 07/29/19  0733 07/26/19  0654   WBC  --  7.0  --    HGB  --  12.9*  --    MCV  --  85  --     353 446   CR 0.54* 0.54* 0.56*

## 2019-08-01 NOTE — PLAN OF CARE
Patient A/Ox4, has been pleasant thus far. VSS. Denies CP, SOB, dizziness/LH. Voiding well in bathroom assumedly, though pt seems to be actively avoiding providing a urine specimen. CMS intact. Tolerating regular diet without NV, pt ate all of tray today and eating well. Activity level is pretty good, pt has only been outside once, but has had 3 visitors - all visit shortly (sometimes very shortly) and claim they are related to pt. Pt slept through the night. IV TKO between vancomycin doses. Pain rated as well managed throughout shift, pt takes nothing for pain PRN. Pt to stay at the hospital until the 16th for antibiotics. Patient has demonstrated ability to call appropriately. Patient is resting with call light within reach. Will continue to monitor.

## 2019-08-01 NOTE — PROGRESS NOTES
Patient has been educated on potential risks of choosing to leave the unit and that the responsibility for patient well-being will belong to the patient. Pt has been informed that admission to hospital is due to need for medical treatment. Education given to the patient on some of the potential risks included but are not limited to:      - lack of access to nursing intervention      - possible missed appointments with MD, therapies, tests      - possible missed medications, antibiotics, management of IV's    Patient Response: verbalizes understanding.     Patient notified staff prior to leaving unit: no  Coban wrap placed over IV prior to pt leaving unit yes

## 2019-08-01 NOTE — PLAN OF CARE
VS: A&Ox4. VSS. Denies SOB, chest pain, dizziness, lightheadedness, numbness, tingling, nausea, and vomiting.    O2: >90% RA. Lung sounds clear bilaterally.    Output: Voids independently without difficulty. Noncompliant with providing urine sample.    Last BM: Bowel sounds active, passing gas, and last BM 7/31.    Activity: Up ad cristian, ambulates frequently.    Up for meals? Up for meals.    Skin: RLE cellulitis, swelling going down.    Pain: Denies pain.    CMS: Intact.    Dressing: N/A.    Diet: Regular diet and tolerating well.    LDA: PIV patent and infusing TKO between IV abx.    Equipment: IV pole, personal belongings at bedside.    Plan: IV antibiotics until 8/16.    Additional Info: Patient has visitors frequently. Patient asked to provide urine sample however patient has not being compliant. A room reach was done by security at 1300 after patient was off unit for 2 hours. Security found and removed multiple needles. Waiting on vancomycin level result. Able to make needs known. Will continue to monitor.

## 2019-08-01 NOTE — PROGRESS NOTES
Room search was performed by security at 1pm.  2 unused insulin syringes were found.  Charge Lauren BASILIO disposed of needles in the sharps container.

## 2019-08-02 VITALS
RESPIRATION RATE: 16 BRPM | DIASTOLIC BLOOD PRESSURE: 64 MMHG | HEART RATE: 71 BPM | SYSTOLIC BLOOD PRESSURE: 114 MMHG | OXYGEN SATURATION: 96 % | TEMPERATURE: 97.1 F | HEIGHT: 71 IN | BODY MASS INDEX: 22.93 KG/M2 | WEIGHT: 163.8 LBS

## 2019-08-02 LAB
ACETAMINOPHEN QUAL: NEGATIVE
AMOBARBITAL QUAL: NEGATIVE
ANION GAP SERPL CALCULATED.3IONS-SCNC: 5 MMOL/L (ref 3–14)
BARBITAL QUAL: NEGATIVE
BUN SERPL-MCNC: 12 MG/DL (ref 7–30)
BUTABARBITAL QUAL: NEGATIVE
BUTALBITAL QUAL: NEGATIVE
CAFFEINE QUAL: NEGATIVE
CALCIUM SERPL-MCNC: 8.2 MG/DL (ref 8.5–10.1)
CARBAMAZEPINE QUAL: NEGATIVE
CARISOPRODOL QUAL: NEGATIVE
CHLORIDE SERPL-SCNC: 104 MMOL/L (ref 94–109)
CHLORPROPAMIDE UR-MCNC: NEGATIVE UG/ML
CO2 SERPL-SCNC: 30 MMOL/L (ref 20–32)
CREAT SERPL-MCNC: 0.57 MG/DL (ref 0.66–1.25)
DRUGS SERPL SCN: NEGATIVE
ERYTHROCYTE [DISTWIDTH] IN BLOOD BY AUTOMATED COUNT: 13.9 % (ref 10–15)
ETHCLORVYNOL QUAL: NEGATIVE
ETHINAMATE QUAL: NEGATIVE
ETHOSUXIMIDE QUAL: NEGATIVE
ETHOTOIN QUAL: NEGATIVE
GFR SERPL CREATININE-BSD FRML MDRD: >90 ML/MIN/{1.73_M2}
GLUCOSE SERPL-MCNC: 121 MG/DL (ref 70–99)
GLUTETHIMIDE QUAL: NEGATIVE
HCT VFR BLD AUTO: 36.8 % (ref 40–53)
HGB BLD-MCNC: 11.7 G/DL (ref 13.3–17.7)
IBUPROFEN QUAL: NEGATIVE
MCH RBC QN AUTO: 27.3 PG (ref 26.5–33)
MCHC RBC AUTO-ENTMCNC: 31.8 G/DL (ref 31.5–36.5)
MCV RBC AUTO: 86 FL (ref 78–100)
MEPHENYTOIN QUAL: NEGATIVE
MEPHOBARBITAL QUAL: NEGATIVE
MEPROBAMATE QUAL: NEGATIVE
METHAQUALONE QUAL: NEGATIVE
METHARBITAL QUAL: NEGATIVE
METHSUXIMIDE QUAL: NEGATIVE
METHYPRYLON QUAL: NEGATIVE
PENTOBARBITAL QUAL: NEGATIVE
PHENACETIN QUAL: NEGATIVE
PHENOBARBITAL QUAL: NEGATIVE
PHENSUXIMIDE QUAL: NEGATIVE
PHENYTOIN QUAL: NEGATIVE
PLATELET # BLD AUTO: 295 10E9/L (ref 150–450)
POTASSIUM SERPL-SCNC: 3.8 MMOL/L (ref 3.4–5.3)
PRIMIDONE QUAL: NEGATIVE
RBC # BLD AUTO: 4.29 10E12/L (ref 4.4–5.9)
SALICYLATE QUAL: NEGATIVE
SECOBARBITAL QUAL: NEGATIVE
SODIUM SERPL-SCNC: 139 MMOL/L (ref 133–144)
TALBUTAL QUAL: NEGATIVE
THEOPHYLLINE QUAL: NEGATIVE
THIOPENTAL QUAL: NEGATIVE
TYBAMATE QUAL: NEGATIVE
VALPROIC ACID QUAL: NEGATIVE
WBC # BLD AUTO: 4.3 10E9/L (ref 4–11)

## 2019-08-02 PROCEDURE — 25800030 ZZH RX IP 258 OP 636

## 2019-08-02 PROCEDURE — 25000132 ZZH RX MED GY IP 250 OP 250 PS 637: Performed by: INTERNAL MEDICINE

## 2019-08-02 PROCEDURE — 80307 DRUG TEST PRSMV CHEM ANLYZR: CPT | Performed by: INTERNAL MEDICINE

## 2019-08-02 PROCEDURE — 99232 SBSQ HOSP IP/OBS MODERATE 35: CPT | Performed by: PSYCHIATRY & NEUROLOGY

## 2019-08-02 PROCEDURE — 85027 COMPLETE CBC AUTOMATED: CPT | Performed by: INTERNAL MEDICINE

## 2019-08-02 PROCEDURE — 25000128 H RX IP 250 OP 636: Performed by: PEDIATRICS

## 2019-08-02 PROCEDURE — 99239 HOSP IP/OBS DSCHRG MGMT >30: CPT | Performed by: INTERNAL MEDICINE

## 2019-08-02 PROCEDURE — 36415 COLL VENOUS BLD VENIPUNCTURE: CPT | Performed by: INTERNAL MEDICINE

## 2019-08-02 PROCEDURE — 80048 BASIC METABOLIC PNL TOTAL CA: CPT | Performed by: INTERNAL MEDICINE

## 2019-08-02 RX ORDER — SODIUM CHLORIDE 9 MG/ML
INJECTION, SOLUTION INTRAVENOUS
Status: COMPLETED
Start: 2019-08-02 | End: 2019-08-02

## 2019-08-02 RX ORDER — ACETAMINOPHEN 325 MG/1
325-650 TABLET ORAL EVERY 4 HOURS PRN
Qty: 100 TABLET | Refills: 0 | Status: SHIPPED | OUTPATIENT
Start: 2019-08-02

## 2019-08-02 RX ORDER — SULFAMETHOXAZOLE/TRIMETHOPRIM 800-160 MG
1 TABLET ORAL 2 TIMES DAILY
Qty: 28 TABLET | Refills: 0 | Status: SHIPPED | OUTPATIENT
Start: 2019-08-02 | End: 2019-08-16

## 2019-08-02 RX ORDER — PANTOPRAZOLE SODIUM 40 MG/1
40 TABLET, DELAYED RELEASE ORAL DAILY
Qty: 30 TABLET | Refills: 0 | Status: SHIPPED | OUTPATIENT
Start: 2019-08-02

## 2019-08-02 RX ORDER — METOPROLOL TARTRATE 25 MG/1
25 TABLET, FILM COATED ORAL 2 TIMES DAILY
Qty: 60 TABLET | Refills: 0 | Status: SHIPPED | OUTPATIENT
Start: 2019-08-02

## 2019-08-02 RX ORDER — CLOTRIMAZOLE 1 G/ML
SOLUTION TOPICAL
Qty: 60 ML | Refills: 0 | Status: SHIPPED | OUTPATIENT
Start: 2019-08-02

## 2019-08-02 RX ORDER — SULFAMETHOXAZOLE/TRIMETHOPRIM 800-160 MG
1 TABLET ORAL 2 TIMES DAILY
Status: DISCONTINUED | OUTPATIENT
Start: 2019-08-02 | End: 2019-08-02 | Stop reason: HOSPADM

## 2019-08-02 RX ADMIN — SODIUM CHLORIDE 500 ML: 9 INJECTION, SOLUTION INTRAVENOUS at 05:07

## 2019-08-02 RX ADMIN — PANTOPRAZOLE SODIUM 40 MG: 40 TABLET, DELAYED RELEASE ORAL at 07:59

## 2019-08-02 RX ADMIN — VANCOMYCIN HYDROCHLORIDE 1000 MG: 1 INJECTION, SOLUTION INTRAVENOUS at 05:07

## 2019-08-02 RX ADMIN — CLOTRIMAZOLE: 10 SOLUTION TOPICAL at 07:58

## 2019-08-02 RX ADMIN — SULFAMETHOXAZOLE AND TRIMETHOPRIM 1 TABLET: 800; 160 TABLET ORAL at 14:06

## 2019-08-02 RX ADMIN — BUPRENORPHINE HCL 4 MG: 2 TABLET SUBLINGUAL at 07:59

## 2019-08-02 ASSESSMENT — ACTIVITIES OF DAILY LIVING (ADL)
ADLS_ACUITY_SCORE: 10
ADLS_ACUITY_SCORE: 10
ADLS_ACUITY_SCORE: 9

## 2019-08-02 NOTE — PLAN OF CARE
Patient A/Ox4. VSS. Pt sleeping for most of shift and declined assessment. Voiding in bathroom assumedly, pt actively avoiding providing a urine sample. Remains much the same as previously. IV infusing TKO and vancomycin. Has been out this shift x1. Patient has demonstrated ability to call appropriately. Patient is resting with call light within reach. Will continue to monitor.

## 2019-08-02 NOTE — PHARMACY-VANCOMYCIN DOSING SERVICE
Pharmacy Vancomycin Note  Date of Service 2019  Patient's  1975   43 year old, male    Indication: SSTI/MRSA Bacteremia  Goal Trough Level: 15-20 mg/L- note previous notes have stated trough goal is 10-15 mg/L, however, due to positive blood culture for MRSA on 19, trough goal should be 15-20 mg/L. ID consult suggest treating for MRSA bacteremia.   Day of Therapy: -present (ID plan for 4 weeks total)  Current Vancomycin regimen:  900 mg IV q8h    Current estimated CrCl = Estimated Creatinine Clearance: 189.2 mL/min (A) (based on SCr of 0.54 mg/dL (L)).    Creatinine for last 3 days  2019:  8:11 AM Creatinine 0.54 mg/dL    Recent Vancomycin Levels (past 3 days)  2019:  1:06 PM Vancomycin Level 8.7 mg/L  2019:  3:34 PM Vancomycin Level 9.1 mg/L (this is an 11-hour trough)    Vancomycin IV Administrations (past 72 hours)                   vancomycin (VANCOCIN) 900 mg in sodium chloride 0.9 % 250 mL intermittent infusion (mg) 900 mg New Bag 19     900 mg New Bag  0437     900 mg New Bag 19     900 mg New Bag  1321     900 mg New Bag  0455     900 mg New Bag 19 2141                Nephrotoxins and other renal medications (From now, onward)    Start     Dose/Rate Route Frequency Ordered Stop    19 2100  vancomycin (VANCOCIN) 900 mg in sodium chloride 0.9 % 250 mL intermittent infusion      900 mg  over 90 Minutes Intravenous EVERY 8 HOURS 19 1444               Contrast Orders - past 72 hours (72h ago, onward)    None          Interpretation of levels and current regimen:  Trough level is  Subtherapeutic    Has serum creatinine changed > 50% in last 72 hours: No    Urine output:  unable to determine    Renal Function: Stable    Plan:  1.  Increase Dose to 1000mg q8h (13.5 mg/kg)   -Patient had previously been therapeutic at steady state at a trough level of 17.1 mg/L on 19 while on this regimen; it appears dose was reduced to 750 mg q8h on  7/27/19 due to previous trough goal of 10-15 mg/L for SSTI, but patient should likely have been treated at a trough goal of 15- 20 mg/L for recent MRSA bacteremia).   -Patient missed a dose of vancomycin on 8/1/19 at 1200 due to his absence from the unit and awaiting a trough level to be drawn. RN also noted loss of IV access.  2.  Pharmacy will check trough levels as appropriate in 1-3 Days.    3. Serum creatinine levels will be ordered a minimum of twice weekly.      Nate March, PharmD

## 2019-08-02 NOTE — DISCHARGE SUMMARY
[facility], [place]  Hospitalist Discharge Summary       Date of Admission:  7/20/2019  Date of Discharge:  8/2/2019  Discharging Provider: Collin Hart MD      Discharge Diagnoses   Rt. Lower extremity cellulitis  MRSA bacteremia  IV heroin Dependence   HTN  Depression and anxiety   Hepatitis C     Follow-ups Needed After Discharge   Follow-up Appointments     Adult [facility] Follow-up and recommended labs and tests      Patient to follow up with PCP:  Dr. Brayan Gomez  [facility]  [address] [address]  Phone [phone]    Appointment is Monday, August 5 at 10:45am    Referral sent to [facility]. They will contact the patient for   appointment.  Phone [phone]         Adult [facility] Follow-up and recommended labs and tests      Please follow up with [facility] and PCP on Monday 8/5 as   organized for you     Appointments on [place] and/or [facility] (with [facility] or [facility]   provider or service). Call [phone] if you haven't heard regarding   these appointments within 7 days of discharge.             Unresulted Labs Ordered in the Past 30 Days of this Admission     Date and Time Order Name Status Description    8/2/2019 0002 Drug screen comprehensive blood ([facility]) In process           Discharge Disposition   Discharged to home  Condition at discharge: Stable    Hospital Course    Mert Lyle is a 43 year old male admitted on 7/20/2019. Patient is IVDU with tendency to inject to the fight mid-leg.  He has cellulitis to the right leg extending into the right ankle.      1)RLE cellulitis dating back to 6/8 incompletely or inadequately treated.    Essentially resolved on IV vanco.   Neg follow up doppler for DVT.   Since patient is actively ambulating multiple times in a day, we stopped DVT prophylaxis      2)MRSA bacteremia documented 6/8.    Clinically not toxic or septic appearing. Neg Echo.    Original plan was to Continue IV vanco (plan for 4 weeks), last day on August 16th Per ID (Dr Putnam).  weekly CBC, CMP and CRP while on Vanc. has been stable  Since patient has not been complying with random urine toxicology studies, evidence of tampering with IV lines, frequent absences from the floor and multiple visitors into his room as well as finding of drug use paraphernalia in the room - The MDT team on floor 8A ( NATY, CC, RN, Charge Nurse, Pharmacists and other MD's) do not think that it is safe to pursue IV abx therapy  Discussed with ID staff. Since patient had had a negative blood culture, and no evidence of fevers, will transition to oral bctrim for another 14 days to complete tretmnent     3)  IV heroin dependence, continuous.    WD controlled on Subutex 4 mg BID  Random urine toxicology studies while on floor. I have discussed this with patient, but has so far not given urine. Comprehensive blood toxicity screen ordered  Since patient is discharging, patient will be prescribed with 2 days worth of suboxoen and will follow up with [facility] addiction [facility] ( organized on monday0     4)  HTN, labile.    Continue  metoprolol 25 mg BID     5)  Depression, anxiety per record.   Appears compensated.      7)  Peripheral neuropathy per record, was on neurontin. Off it right now.  .   8)  Hepatitis C with active disease. 5.1 Million copies of hep C on 07/23/19. F/u PMD             Consultations This Hospital Stay   PHARMACY TO DOSE VANCO  SOCIAL WORK IP CONSULT  PHARMACY TO DOSE VANCO  PSYCHIATRY IP CONSULT  INFECTIOUS DISEASE [facility] ADULT IP CONSULT  CHEMICAL DEPENDENCY IP CONSULT  PSYCHIATRY IP CONSULT    Code Status   Full Code    Time Spent on this Encounter   ICollin, personally saw the patient today and spent greater than 30 minutes discharging this patient.       Collin Hart MD  [facility],  [place]  ______________________________________________________________________    Physical Exam   Vital Signs: Temp: 97.1  F (36.2  C) Temp src: Oral BP: 114/64 Pulse: 71   Resp: 16 SpO2: 96 % O2 Device: None (Room air)    Weight: 163 lbs 12.83 oz  CVS: Normal Heart sounds   RS: Clear breath sounds bilaterally   Abdomen: Soft and non tender. Normal bowel sounds.  Neuro: Alert and orientated X 3   Rt lower extremity with improved swelling and erythema        Primary Care Physician   Physician No Ref-Primary    Discharge Orders      Adult [facility] Follow-up and recommended labs and tests    Patient to follow up with PCP:  Dr. Brayan Gomez  Family medicine  [address] [address]  Phone [phone]    Appointment is Monday, August 5 at 10:45am    Referral sent to [facility]. They will contact the patient for appointment.  Phone [phone]     Reason for your hospital stay    MRSA Bacteremia  And cellulitis     Adult [facility] Follow-up and recommended labs and tests    Please follow up with [facility] and PCP on Monday 8/5 as organized for you     Appointments on [place] and/or [facility] (with [facility] or [facility] provider or service). Call [phone] if you haven't heard regarding these appointments within 7 days of discharge.     Activity    Your activity upon discharge: activity as tolerated     Discharge Instructions    Please follow up with your PCP and addiction clinic as organized.     Full Code     Diet    Follow this diet upon discharge: Orders Placed This Encounter      Combination Diet Regular Diet Adult       Significant Results and Procedures   Results for orders placed or performed during the hospital encounter of 07/20/19   US Lower Extremity Venous Duplex Right    Narrative    EXAMINATION: DOPPLER VENOUS ULTRASOUND OF THE RIGHT LOWER EXTREMITY,  7/20/2019 7:00 PM     COMPARISON: No relevant prior exam.    HISTORY: Erythema and swelling of the  right lower extremity.    TECHNIQUE:  Gray-scale evaluation with compression, spectral flow, and  color Doppler assessment of the deep venous system of the right leg  from groin to knee, and then at the ankle.    FINDINGS:  In the right lower extremity, the common femoral, femoral, popliteal  and posterior tibial veins demonstrate normal compressibility and  blood flow.    Mildly enlarged lymph nodes in the right groin. For example there is a  1.9 x 1.4 x 0.7 cm node. And a and a 3.1 x 2.5 x 1.1 cm node. Nodes  demonstrate preserved fatty catia.      Impression    IMPRESSION:  1.  No evidence of right lower extremity deep venous thrombosis.  2.  Right inguinal adenopathy without suspicious features, possibly  reactive.    I have personally reviewed the examination and initial interpretation  and I agree with the findings.    ISAMAR ALVAREZ MD       Discharge Medications   Current Discharge Medication List      START taking these medications    Details   acetaminophen (TYLENOL) 325 MG tablet Take 1-2 tablets (325-650 mg) by mouth every 4 hours as needed for mild pain or fever  Qty: 100 tablet, Refills: 0    Associated Diagnoses: MRSA cellulitis      clotrimazole (LOTRIMIN) 1 % external solution Apply to both feet after showering twice daily  Qty: 60 mL, Refills: 0    Associated Diagnoses: MRSA cellulitis      metoprolol tartrate (LOPRESSOR) 25 MG tablet Take 1 tablet (25 mg) by mouth 2 times daily  Qty: 60 tablet, Refills: 0    Associated Diagnoses: MRSA cellulitis      pantoprazole (PROTONIX) 40 MG EC tablet Take 1 tablet (40 mg) by mouth daily  Qty: 30 tablet, Refills: 0    Associated Diagnoses: MRSA cellulitis      sulfamethoxazole-trimethoprim (BACTRIM DS/SEPTRA DS) 800-160 MG tablet Take 1 tablet by mouth 2 times daily for 14 days  Qty: 28 tablet, Refills: 0    Associated Diagnoses: MRSA cellulitis           Allergies   Allergies   Allergen Reactions     Shellfish-Derived Products Shortness Of Breath

## 2019-08-02 NOTE — PLAN OF CARE
"  VS: VSS. Denies CP/SOB   O2: >90% on RA   Output: Voiding w/o pain or difficulty.   Last BM: 7/31/19 per pt report   Activity: Up independently   Up for meals? No   Skin: Edema in RLE otherwise intact   Pain: Denies   CMS: Intact   Dressing: None   Diet: Regular, tolerating well   LDA: PIV in L forearm removed red and painful. New 20 gauge PIV in R forearm TKO btw abx   Equipment: IV pole   Plan: TBD, possibly [facility] for treatment.    Additional Info:      Patient has been educated on potential risks of choosing to leave the unit and that the responsibility for patient well-being will belong to the patient. Pt has been informed that admission to hospital is due to need for medical treatment. Education given to the patient on some of the potential risks included but are not limited to:      - lack of access to nursing intervention      - possible missed appointments with MD, therapies, tests      - possible missed medications, antibiotics, management of IV's    Patient Response: \"ok\"    Patient notified staff prior to leaving unit: no  Coban wrap placed over IV prior to pt leaving unit yes    "

## 2019-08-02 NOTE — PROGRESS NOTES
VS: stable   O2: Room air saturations WNL   Output: UP to bathroom   Last BM: 8/1/2019 reports patient   Activity: Up ad cristian   Skin: Right lower ankle area still slightly swollen.    Pain: Denies   CMS: Intact   Dressing: NA   Diet: Regular.    LDA: NA   Equipment: NA   Plan: Plan is for patient to be switched to oral antibiotics and possible discharge today.    Additional Info: Pt is very sleepy today. At times a lot of visitor and patient exhibit suspicious behavior. In completing solano order this am. Patient had a pocket knife sitting out on table along side his water. Status of patient will continue to be monitored.

## 2019-08-02 NOTE — PROGRESS NOTES
Focus: Patients discharge   DB: patient to be discharged today per Drs orders.   I: Had patients scripts filled here prior to discharge. IV was discontinued prior to discharge. Follow up cares were coordinating for follow up cares.   E: Patient seemed to have an understanding of plan of cares for discharge. Patient was given all his belongings back to him along with filled script. Patient was escorted to front door with belongings to be discharged.

## 2019-08-02 NOTE — CONSULTS
"    Psychiatry Consultation; Follow up            Reason for Consult, requesting source:    Needs bridge RX of Suboxone   Requesting source: Braden Barajas               Interim history:    This gentleman, who is a long-term user of intravenous heroin, is admitted with cellulitis of his leg which happens to be his preferred injection site.  While in the hospital he has been stable on buprenorphine 4 mg twice a day, and he is set up for an appointment with a Suboxone prescriber on Monday, so he will need 3 days of Suboxone as a bridge prescription.  He said that he has been on Suboxone before and his dose was 24 mg/day.  He feels that he will need more than 4 mg per dose to keep him safe until he is seen for his Suboxone appointment.  [place] pharmacy database was queried and he has no controlled prescriptions filled within the last year.  He has a history of depression, but feels quite well he recently, with no suicidal thoughts etc.  Also history of some PTSD but currently asymptomatic.           Medications:     Current Facility-Administered Medications   Medication     acetaminophen (TYLENOL) tablet 325-650 mg     buprenorphine (SUBUTEX) sublingual tablet 4 mg     clotrimazole (LOTRIMIN) 1 % solution     metoprolol tartrate (LOPRESSOR) tablet 25 mg     naloxone (NARCAN) injection 0.1-0.4 mg     pantoprazole (PROTONIX) EC tablet 40 mg     sulfamethoxazole-trimethoprim (BACTRIM DS/SEPTRA DS) 800-160 MG per tablet 1 tablet              MSE:     Sitting up in his hospital bed watching a program on his phone. He is well groomed, pleasant, cooperative. Speech fluent. There is no rigidity of the extremities.  Associations tight.  Mood is \"OK\"  Affect quite reserved.  Thought process logical, linear.  Thought content negative for suicidal thoughts or delusions.  Oriented x3.  Recent and remote memory, attention span and concentration are intact.  Fund of knowledge, use of language appropriate.  Insight and judgment " "fair.     Vital signs:  Temp: 97.1  F (36.2  C) Temp src: Oral BP: 114/64 Pulse: 71   Resp: 16 SpO2: 96 % O2 Device: None (Room air)   Height: 180.3 cm (5' 11\") Weight: 74.3 kg (163 lb 12.8 oz)  Estimated body mass index is 22.85 kg/m  as calculated from the following:    Height as of this encounter: 1.803 m (5' 11\").    Weight as of this encounter: 74.3 kg (163 lb 12.8 oz).            DSM-5 Diagnosis:   Opiate use disorder  History of MDD verus BAD and PTSD          Assessment:   This gentleman is interested in returning to Suboxone maintenance, so he will need a bridge prescription to get him to his appointment on Monday.  He needs a higher dose than 4 mg/day to help protect him from relapse into Heroin use.           Summary of Recommendations:   Called in RX to discharge pharmacy for Suboxone 8/2 mg #6 to use BID.   Has a follow up appointment for Suboxone 8/5.   page me at [phone] as needed      \"This dictation was performed with voice recognition software and may contain errors,  omissions and inadvertent word substitution.\"          "

## 2019-08-02 NOTE — PROGRESS NOTES
Care Coordinator Progress Note    Admission Date/Time:  7/20/2019  Attending MD:  Braden Barajas MD    Data  Chart reviewed, discussed with interdisciplinary team.   Patient was admitted for:    Cellulitis of right lower extremity  MRSA cellulitis.    Concerns with insurance coverage for discharge needs: None.  Current Living Situation: Patient lives alone.  Support System: unknown  Services Involved: Follow up PCP appointment  Transportation at Discharge: None  Transportation to Medical Appointments:   - Not applicable  Barriers to Discharge: lack of support system/caregiver and substance abuse    Coordination of Care and Referrals: PCP appointment made for patient. Monday, August 5 at 10:45am. Dr. Brayan Gomez  Family medicine   [address] [address]   [phone]   Referral also sent to [facility] [phone].      Plan  Anticipated Discharge Date:  8/2/2019  Anticipated Discharge Plan:  Home    Ember Marquez RN, BSN  Care Coordinator, 8A  Phone [phone]  Pager [phone]

## 2019-08-05 ENCOUNTER — PATIENT OUTREACH (OUTPATIENT)
Dept: CARE COORDINATION | Facility: CLINIC | Age: 44
End: 2019-08-05

## 2021-12-03 NOTE — PLAN OF CARE
VS:     Pt A/O X 4. Afebrile. VSS. /83 (BP Location: Left arm)   Pulse 69   Temp 96.6  F (35.9  C) (Oral)   Resp 16   SpO2 100%. Lungs- clear and equal bilaterally. Denies nausea, shortness of breath, and chest pain.     Output:     Bowels- active in all four quadrants. Voids spontaneously without difficulty per pt report. 2 loose stools this shift.      Activity:     Pt up independently. Ambulated outside x 4. RN requested that patient notify staff before leaving unit. Patient does not notify.      Skin: Intact. RLE edematous and skin peeling.      Pain:     Has pain in the RLE. Pt declined interventions.      CMS:     CMS and Neuro's are intact. Denies numbness and tingling in all extremities.       Dressing:     RLE BETSY. Foot soaked in soap and water and Lotrimin applied.      Diet:     Pt is on a Regular diet.     LDA:     PIV is patent in the right forearm and infusing TKO.      Equipment:     IV pole.      Plan:     TBD.                       [Nutrition/ Exercise/ Weight Management] : nutrition, exercise, weight management [Body Image] : body image [Vitamins/Supplements] : vitamins/supplements

## 2024-12-03 NOTE — PROGRESS NOTES
Patient has been educated on potential risks of choosing to leave the unit and that the responsibility for patient well-being will belong to the patient. Pt has been informed that admission to hospital is due to need for medical treatment. Education given to the patient on some of the potential risks included but are not limited to:      - lack of access to nursing intervention      - possible missed appointments with MD, therapies, tests      - possible missed medications, antibiotics, management of IV's    Patient Response: Acceptance    Patient notified staff prior to leaving unit: no  Coban wrap placed over IV prior to pt leaving unit: yes but IV infusing, only first port covered   EKG completed